# Patient Record
Sex: FEMALE | Race: WHITE | NOT HISPANIC OR LATINO | Employment: OTHER | URBAN - METROPOLITAN AREA
[De-identification: names, ages, dates, MRNs, and addresses within clinical notes are randomized per-mention and may not be internally consistent; named-entity substitution may affect disease eponyms.]

---

## 2017-01-05 PROBLEM — L03.116 CELLULITIS OF LEFT LOWER EXTREMITY: Status: ACTIVE | Noted: 2017-01-05

## 2017-01-05 PROBLEM — L03.90 CELLULITIS: Status: ACTIVE | Noted: 2017-01-05

## 2017-01-05 PROBLEM — R60.9 SWELLING: Status: ACTIVE | Noted: 2017-01-05

## 2017-01-06 PROBLEM — R60.9 SWELLING: Status: ACTIVE | Noted: 2017-01-06

## 2017-01-06 PROBLEM — L03.116 CELLULITIS OF LEFT LOWER EXTREMITY: Status: ACTIVE | Noted: 2017-01-06

## 2017-01-07 PROBLEM — N17.9 AKI (ACUTE KIDNEY INJURY): Status: ACTIVE | Noted: 2017-01-07

## 2017-01-07 PROBLEM — A41.9 SEVERE SEPSIS: Status: ACTIVE | Noted: 2017-01-07

## 2017-01-07 PROBLEM — R65.20 SEVERE SEPSIS: Status: ACTIVE | Noted: 2017-01-07

## 2017-01-10 PROBLEM — R65.21 SEPTIC SHOCK: Status: ACTIVE | Noted: 2017-01-07

## 2017-01-11 PROBLEM — A41.9 SEPTIC SHOCK: Status: RESOLVED | Noted: 2017-01-07 | Resolved: 2017-01-11

## 2017-01-11 PROBLEM — R65.21 SEPTIC SHOCK: Status: RESOLVED | Noted: 2017-01-07 | Resolved: 2017-01-11

## 2017-01-26 PROBLEM — L03.116 LEFT LEG CELLULITIS: Status: ACTIVE | Noted: 2017-01-26

## 2017-02-07 PROBLEM — R60.0 LEG EDEMA, LEFT: Status: ACTIVE | Noted: 2017-02-07

## 2017-02-13 PROBLEM — L03.90 CELLULITIS: Status: ACTIVE | Noted: 2017-02-13

## 2017-02-21 PROBLEM — N17.9 AKI (ACUTE KIDNEY INJURY): Status: RESOLVED | Noted: 2017-01-07 | Resolved: 2017-02-21

## 2017-03-09 PROBLEM — L03.116 CELLULITIS OF LEFT LOWER EXTREMITY: Status: RESOLVED | Noted: 2017-01-06 | Resolved: 2017-03-09

## 2017-03-09 PROBLEM — R60.0 LEG EDEMA, LEFT: Status: RESOLVED | Noted: 2017-02-07 | Resolved: 2017-03-09

## 2017-03-09 PROBLEM — L03.116 LEFT LEG CELLULITIS: Status: RESOLVED | Noted: 2017-01-26 | Resolved: 2017-03-09

## 2017-03-09 PROBLEM — L03.90 CELLULITIS: Status: RESOLVED | Noted: 2017-02-13 | Resolved: 2017-03-09

## 2017-03-22 PROBLEM — K81.9 CHOLECYSTITIS: Status: ACTIVE | Noted: 2017-03-22

## 2017-03-22 PROBLEM — K81.0 ACUTE CHOLECYSTITIS: Status: ACTIVE | Noted: 2017-03-22

## 2017-03-23 PROBLEM — D72.819 LEUKOPENIA: Status: ACTIVE | Noted: 2017-03-23

## 2017-03-25 PROBLEM — K81.0 ACUTE CHOLECYSTITIS: Status: RESOLVED | Noted: 2017-03-22 | Resolved: 2017-03-25

## 2017-03-25 PROBLEM — R10.9 ABDOMINAL PAIN: Status: ACTIVE | Noted: 2017-03-22

## 2017-03-25 PROBLEM — L03.119 CELLULITIS OF LEG: Status: ACTIVE | Noted: 2017-03-25

## 2017-03-27 PROBLEM — L03.119 CELLULITIS OF LEG: Status: ACTIVE | Noted: 2017-03-27

## 2017-04-03 PROBLEM — R10.9 ABDOMINAL PAIN: Status: RESOLVED | Noted: 2017-03-22 | Resolved: 2017-04-03

## 2017-04-07 PROBLEM — D50.0 IRON DEFICIENCY ANEMIA DUE TO CHRONIC BLOOD LOSS: Status: ACTIVE | Noted: 2017-04-07

## 2017-04-07 PROBLEM — D61.818 PANCYTOPENIA: Status: ACTIVE | Noted: 2017-04-07

## 2017-04-07 PROBLEM — K74.60 CIRRHOSIS OF LIVER WITHOUT ASCITES: Status: ACTIVE | Noted: 2017-04-07

## 2017-04-07 PROBLEM — M54.50 CHRONIC BILATERAL LOW BACK PAIN WITHOUT SCIATICA: Status: ACTIVE | Noted: 2017-04-07

## 2017-04-07 PROBLEM — G89.29 CHRONIC BILATERAL LOW BACK PAIN WITHOUT SCIATICA: Status: ACTIVE | Noted: 2017-04-07

## 2017-06-08 PROBLEM — Z86.79 HISTORY OF SICK SINUS SYNDROME: Status: ACTIVE | Noted: 2017-06-08

## 2017-06-08 PROBLEM — I31.39 PERICARDIAL EFFUSION: Status: ACTIVE | Noted: 2017-06-08

## 2017-06-29 PROBLEM — R07.9 CHEST PAIN: Status: ACTIVE | Noted: 2017-06-29

## 2017-06-30 PROBLEM — R10.10 UPPER ABDOMINAL PAIN: Status: ACTIVE | Noted: 2017-06-30

## 2017-07-10 PROBLEM — L03.119 CELLULITIS OF LEG: Status: RESOLVED | Noted: 2017-03-27 | Resolved: 2017-07-10

## 2017-07-10 PROBLEM — R07.9 CHEST PAIN: Status: RESOLVED | Noted: 2017-06-29 | Resolved: 2017-07-10

## 2017-07-10 PROBLEM — R10.10 UPPER ABDOMINAL PAIN: Status: RESOLVED | Noted: 2017-06-30 | Resolved: 2017-07-10

## 2017-07-11 PROBLEM — K80.20 CALCULUS OF GALLBLADDER WITHOUT CHOLECYSTITIS WITHOUT OBSTRUCTION: Status: ACTIVE | Noted: 2017-07-11

## 2017-08-03 PROBLEM — E87.70 VOLUME OVERLOAD: Status: ACTIVE | Noted: 2017-08-03

## 2017-08-03 PROBLEM — E87.6 HYPOKALEMIA: Status: ACTIVE | Noted: 2017-08-03

## 2017-08-24 PROBLEM — D62 ACUTE BLOOD LOSS ANEMIA: Status: ACTIVE | Noted: 2017-08-24

## 2017-08-24 PROBLEM — K92.1 BLOOD IN STOOL: Status: ACTIVE | Noted: 2017-08-24

## 2017-08-24 PROBLEM — R11.2 INTRACTABLE NAUSEA AND VOMITING: Status: ACTIVE | Noted: 2017-08-24

## 2017-08-29 ENCOUNTER — PATIENT OUTREACH (OUTPATIENT)
Dept: ADMINISTRATIVE | Facility: CLINIC | Age: 82
End: 2017-08-29

## 2017-08-29 NOTE — PROGRESS NOTES
TCC script completed with patient's daughter in law Juana Frazier. Reports needs mattress for hospital bed, Call to [facility] [phone], spoke with Denisse from [facility]. Denisse indicated patient can have new bed if Dr Mercado sends progress notes indicating need for bed and fax to [phone]. Message sent to Dr Mercado

## 2017-08-29 NOTE — PATIENT INSTRUCTIONS
"How to Control Nausea and Vomiting     Taken before meals, medicines can help ease nausea.    Nausea is feeling that you need to throw up. Throwing up occurs when your body forces food that is in your stomach out through your mouth. Nausea and vomiting are symptoms that are caused by many things. They can happen when a condition or disease, medicine, medical treatment, or a poisonous substance affects the area in your brain that controls vomiting. Some conditions or diseases can cause nausea, abdominal pain or cramps, and vomiting. The symptoms can be mild and go away by themselves. Other symptoms can be serious. You will need to see your healthcare provider for these.  Nausea and vomiting are common. They can be caused by many things. These include:  · "Stomach flu" (gastroenteritis)  · Food poisoning  · Stomach pain (gastritis)  · Blockages  They can also be caused by a head injury, an infection in the brain or inside the ear, or migraines. Other common causes of nausea and vomiting include:  · Brain tumor  · Brain bruise  · Motion sickness  · Drugs. These include alcohol, pain medicines such as morphine, and cancer medicines.  · Toxins. These are poisonous things like plants or liquids that are swallowed by accident.  · Advanced types of cancer  · Movement problems (psychogenic problems)  · Extra pressure in the fluid that surrounds the brain and spinal cord (elevated intracranial pressure)     Nausea and vomiting are also common side effects of chemotherapy and radiation therapy. Side effects happen when treatment changes some normal cells as well as cancer cells. In this case, the cells lining your stomach and the part of your brain that controls vomiting are affected. Other more serious causes of vomiting may be hard to find early in the illness.     When to seek medical advice  Call your healthcare provider right away if you have the following:  · Nausea or vomiting that lasts 24 hours or more  · Trouble " keeping fluids down   Medicines can help  Nausea or vomiting can often be prevented or controlled with medicines (antiemetics). Your doctor may give you antiemetics before or after treatment if you are getting chemotherapy or other medical treatments that cause nausea or vomiting.  Eating tips  · If you have medicines to control nausea, take them before meals as directed.  · Avoid fatty or greasy foods while nauseated.  · Eat small meals slowly throughout the day.  · Ask someone to sit with you while you eat to keep you from thinking about feeling nauseated.  · Eat foods at room temperature or colder to avoid strong smells.  · Eat dry foods, such as toast, crackers, or pretzels. Also eat cool, light foods, such as applesauce, and bland foods, such as oatmeal or skinned chicken.   Other ways to feel better  · Get a little fresh air. Take a short walk.  · Talk to a friend, listen to music, or watch TV.  · Take a few deep, slow breaths.  · Eat by candlelight or in surroundings that you find relaxing.  · Use a technique, such as guided imagery, to help you relax. Imagine yourself in a beautiful, restful scene. Or daydream about the place youd most like to be.  Date Last Reviewed: 1/6/2016  © 3752-9493 [employer]. [address] [place] [address], [place] [address]. All rights reserved. This information is not intended as a substitute for professional medical care. Always follow your healthcare professional's instructions.

## 2017-09-18 PROBLEM — N17.9 ACUTE KIDNEY INJURY: Status: ACTIVE | Noted: 2017-09-18

## 2017-09-18 PROBLEM — R65.20 SEVERE SEPSIS: Status: ACTIVE | Noted: 2017-09-18

## 2017-09-18 PROBLEM — A41.9 SEVERE SEPSIS: Status: ACTIVE | Noted: 2017-09-18

## 2017-09-27 PROBLEM — A41.9 SEVERE SEPSIS: Status: RESOLVED | Noted: 2017-09-18 | Resolved: 2017-09-27

## 2017-09-27 PROBLEM — N17.9 ACUTE KIDNEY INJURY: Status: RESOLVED | Noted: 2017-09-18 | Resolved: 2017-09-27

## 2017-09-27 PROBLEM — R65.20 SEVERE SEPSIS: Status: RESOLVED | Noted: 2017-09-18 | Resolved: 2017-09-27

## 2017-09-29 ENCOUNTER — OUTPATIENT CASE MANAGEMENT (OUTPATIENT)
Dept: ADMINISTRATIVE | Facility: OTHER | Age: 82
End: 2017-09-29

## 2017-09-29 ENCOUNTER — PATIENT OUTREACH (OUTPATIENT)
Dept: ADMINISTRATIVE | Facility: CLINIC | Age: 82
End: 2017-09-29

## 2017-09-29 DIAGNOSIS — A41.51 SEPSIS DUE TO ESCHERICHIA COLI: Primary | ICD-10-CM

## 2017-09-29 NOTE — PATIENT INSTRUCTIONS
Bacteremia, Suspected (Adult)  Your fever today is probably due to a viral illness. However, sometimes fever can be an early sign of a more serious bacterial infection. Bacteremia is a bacterial infection that has spread to the bloodstream. This is serious because it can spread to other organs, including the kidneys, brain, and lungs.  Your healthcare provider will perform tests (cultures) to check for bacteremia. Until the test results are known you should watch for the signs listed below.  Causes  Bacteremia usually starts with a typical infection, but it then spreads to the blood. Almost any type of infection can cause bacteremia, including a urinary tract infection, skin infection, gastrointestinal problem, surgical complication, or pneumonia.  Symptoms  At first symptoms may seem like any typical infection or illness, but then they worsen. Symptoms of bacteremia can include:  · Fever and chills  · Loss of appetite  · Nausea or vomiting  · Trouble breathing or fast breathing  · Fast heart rate  · Feeling lightheaded or faint  · Skin rashes or blotches  Home care  Follow these guidelines when caring for yourself at home.  · Rest at home for the first 2 to 3 days. When resuming activity, don't let yourself become overly tired.  · You can take acetaminophen or ibuprofen for pain, unless you were given a different pain medicine to use. (Note: If you have chronic liver or kidney disease or have ever had a stomach ulcer or gastrointestinal bleeding, talk with your healthcare provider before using these medicines. Also talk to your provider if you are taking medicine to prevent blood clots.) Aspirin should never be given to anyone younger than 18 years of age who is ill with a viral infection or fever. It may cause severe liver or brain damage.  · If you were given antibiotics, take them until they are used up, or your healthcare provider tells you to stop. It is important to finish the antibiotics even though you feel  better. This is to make sure the infection has cleared.  · Your appetite may be poor, so a light diet is fine. Avoid dehydration by drinking 6 to 8 glasses of fluid per day (such as water, soft drinks, sports drinks, juices, tea, or soup).  Follow-up care  Follow up with your healthcare provider, or as advised.  · If a culture was done, you will be notified if your treatment needs to be changed. You can call as directed for the results.  · If X-rays, a CT, or an ultrasound were done, a specialist will review them. You will be notified of any findings that may affect your care.  Call 911  Contact emergency services right away if any of these occur:  · Trouble breathing or swallowing, or wheezing  · Chest pain  · Confusion or sudden change in behavior  · Extreme drowsiness or trouble awakening  · Fainting or loss of consciousness  · Rapid heart rate  · Low blood pressure  · Vomiting blood, or large amounts of blood in stool  · Seizure  When to seek medical advice  Call your healthcare provider right away if any of these occur:  · Cough with lots of colored sputum (mucus), or blood in your sputum  · Severe headache  · Severe face, neck, throat, or ear pain  · Pain in the right lower abdomen  · Weakness, dizziness, repeated vomiting, or diarrhea  · Joint pain or a new rash  · Burning when urinating  · Fever of 100.4°F (38°C) or higher  Date Last Reviewed: 7/30/2015  © 5846-6284 [employer]. [address] [place] [address], [place] [address]. All rights reserved. This information is not intended as a substitute for professional medical care. Always follow your healthcare professional's instructions.

## 2017-09-29 NOTE — PROGRESS NOTES
Thank you for the referral.  Patient has been assigned to [facility] for low risk screening for Outpatient Case Management.     Reason for referral: Low risk     Sepsis due to Escherichia coli    Thank you,    Felisha Garcia, SSC

## 2017-09-30 PROBLEM — K92.0 HEMATEMESIS: Status: ACTIVE | Noted: 2017-09-30

## 2017-09-30 PROBLEM — K92.2 UPPER GI BLEED: Status: ACTIVE | Noted: 2017-09-30

## 2017-10-01 PROBLEM — K92.0 HEMATEMESIS: Status: ACTIVE | Noted: 2017-10-01

## 2017-10-02 ENCOUNTER — HOSPITAL ENCOUNTER (INPATIENT)
Facility: HOSPITAL | Age: 82
LOS: 3 days | Discharge: HOME HEALTH | DRG: 300 | End: 2017-10-05
Attending: INTERNAL MEDICINE | Admitting: INTERNAL MEDICINE
Payer: MEDICARE

## 2017-10-02 DIAGNOSIS — K92.2 GASTROINTESTINAL HEMORRHAGE, UNSPECIFIED GASTROINTESTINAL HEMORRHAGE TYPE: ICD-10-CM

## 2017-10-02 DIAGNOSIS — E78.5 HYPERLIPIDEMIA, UNSPECIFIED HYPERLIPIDEMIA TYPE: ICD-10-CM

## 2017-10-02 DIAGNOSIS — D62 ACUTE BLOOD LOSS ANEMIA: ICD-10-CM

## 2017-10-02 DIAGNOSIS — K92.2 GIB (GASTROINTESTINAL BLEEDING): ICD-10-CM

## 2017-10-02 LAB
ABO + RH BLD: NORMAL
ALBUMIN SERPL BCP-MCNC: 2 G/DL
ALP SERPL-CCNC: 46 U/L
ALT SERPL W/O P-5'-P-CCNC: 7 U/L
ANION GAP SERPL CALC-SCNC: 6 MMOL/L
AST SERPL-CCNC: 20 U/L
BASOPHILS # BLD AUTO: 0.03 K/UL
BASOPHILS NFR BLD: 0.7 %
BILIRUB SERPL-MCNC: 1.9 MG/DL
BLD GP AB SCN CELLS X3 SERPL QL: NORMAL
BUN SERPL-MCNC: 41 MG/DL
CALCIUM SERPL-MCNC: 8.3 MG/DL
CHLORIDE SERPL-SCNC: 107 MMOL/L
CO2 SERPL-SCNC: 24 MMOL/L
CREAT SERPL-MCNC: 1.6 MG/DL
DIFFERENTIAL METHOD: ABNORMAL
EOSINOPHIL # BLD AUTO: 0.1 K/UL
EOSINOPHIL NFR BLD: 2.7 %
ERYTHROCYTE [DISTWIDTH] IN BLOOD BY AUTOMATED COUNT: 16.8 %
EST. GFR  (AFRICAN AMERICAN): 33.2 ML/MIN/1.73 M^2
EST. GFR  (NON AFRICAN AMERICAN): 28.8 ML/MIN/1.73 M^2
GLUCOSE SERPL-MCNC: 76 MG/DL
HCT VFR BLD AUTO: 27.1 %
HGB BLD-MCNC: 8.9 G/DL
INR PPP: 1.2
LYMPHOCYTES # BLD AUTO: 0.6 K/UL
LYMPHOCYTES NFR BLD: 14.4 %
MCH RBC QN AUTO: 28.7 PG
MCHC RBC AUTO-ENTMCNC: 32.8 G/DL
MCV RBC AUTO: 87 FL
MONOCYTES # BLD AUTO: 0.4 K/UL
MONOCYTES NFR BLD: 9.3 %
NEUTROPHILS # BLD AUTO: 3 K/UL
NEUTROPHILS NFR BLD: 72.7 %
PLATELET # BLD AUTO: 143 K/UL
PMV BLD AUTO: 9.5 FL
POTASSIUM SERPL-SCNC: 4.5 MMOL/L
PROT SERPL-MCNC: 6.6 G/DL
PROTHROMBIN TIME: 12.1 SEC
RBC # BLD AUTO: 3.1 M/UL
SODIUM SERPL-SCNC: 137 MMOL/L
WBC # BLD AUTO: 4.1 K/UL

## 2017-10-02 PROCEDURE — 25000242 PHARM REV CODE 250 ALT 637 W/ HCPCS: Performed by: HOSPITALIST

## 2017-10-02 PROCEDURE — 20000000 HC ICU ROOM

## 2017-10-02 PROCEDURE — 86900 BLOOD TYPING SEROLOGIC ABO: CPT

## 2017-10-02 PROCEDURE — 86901 BLOOD TYPING SEROLOGIC RH(D): CPT

## 2017-10-02 PROCEDURE — C9113 INJ PANTOPRAZOLE SODIUM, VIA: HCPCS | Performed by: HOSPITALIST

## 2017-10-02 PROCEDURE — 85025 COMPLETE CBC W/AUTO DIFF WBC: CPT | Mod: 91

## 2017-10-02 PROCEDURE — 85610 PROTHROMBIN TIME: CPT

## 2017-10-02 PROCEDURE — 80053 COMPREHEN METABOLIC PANEL: CPT | Mod: 91

## 2017-10-02 PROCEDURE — A4216 STERILE WATER/SALINE, 10 ML: HCPCS | Performed by: HOSPITALIST

## 2017-10-02 PROCEDURE — 27000221 HC OXYGEN, UP TO 24 HOURS

## 2017-10-02 PROCEDURE — 99291 CRITICAL CARE FIRST HOUR: CPT | Mod: GC,,, | Performed by: INTERNAL MEDICINE

## 2017-10-02 PROCEDURE — 63600175 PHARM REV CODE 636 W HCPCS: Performed by: HOSPITALIST

## 2017-10-02 PROCEDURE — 25000003 PHARM REV CODE 250: Performed by: HOSPITALIST

## 2017-10-02 RX ORDER — SERTRALINE HYDROCHLORIDE 25 MG/1
25 TABLET, FILM COATED ORAL DAILY
Status: DISCONTINUED | OUTPATIENT
Start: 2017-10-03 | End: 2017-10-05 | Stop reason: HOSPADM

## 2017-10-02 RX ORDER — ASCORBIC ACID 500 MG
500 TABLET ORAL DAILY
Status: DISCONTINUED | OUTPATIENT
Start: 2017-10-03 | End: 2017-10-05 | Stop reason: HOSPADM

## 2017-10-02 RX ORDER — PANTOPRAZOLE SODIUM 40 MG/10ML
40 INJECTION, POWDER, LYOPHILIZED, FOR SOLUTION INTRAVENOUS 2 TIMES DAILY
Status: DISCONTINUED | OUTPATIENT
Start: 2017-10-02 | End: 2017-10-02

## 2017-10-02 RX ORDER — MAGNESIUM SULFATE HEPTAHYDRATE 40 MG/ML
2 INJECTION, SOLUTION INTRAVENOUS
Status: DISCONTINUED | OUTPATIENT
Start: 2017-10-02 | End: 2017-10-05 | Stop reason: HOSPADM

## 2017-10-02 RX ORDER — POTASSIUM CHLORIDE 7.45 MG/ML
10 INJECTION INTRAVENOUS
Status: DISCONTINUED | OUTPATIENT
Start: 2017-10-02 | End: 2017-10-05 | Stop reason: HOSPADM

## 2017-10-02 RX ORDER — ONDANSETRON 2 MG/ML
4 INJECTION INTRAMUSCULAR; INTRAVENOUS EVERY 8 HOURS PRN
Status: DISCONTINUED | OUTPATIENT
Start: 2017-10-02 | End: 2017-10-05 | Stop reason: HOSPADM

## 2017-10-02 RX ORDER — MAGNESIUM SULFATE HEPTAHYDRATE 40 MG/ML
4 INJECTION, SOLUTION INTRAVENOUS
Status: DISCONTINUED | OUTPATIENT
Start: 2017-10-02 | End: 2017-10-05 | Stop reason: HOSPADM

## 2017-10-02 RX ORDER — MECLIZINE HYDROCHLORIDE 25 MG/1
25 TABLET ORAL 2 TIMES DAILY PRN
Status: DISCONTINUED | OUTPATIENT
Start: 2017-10-02 | End: 2017-10-05 | Stop reason: HOSPADM

## 2017-10-02 RX ORDER — ONDANSETRON 8 MG/1
8 TABLET, ORALLY DISINTEGRATING ORAL EVERY 8 HOURS PRN
Status: DISCONTINUED | OUTPATIENT
Start: 2017-10-02 | End: 2017-10-05 | Stop reason: HOSPADM

## 2017-10-02 RX ORDER — PANTOPRAZOLE SODIUM 40 MG/10ML
40 INJECTION, POWDER, LYOPHILIZED, FOR SOLUTION INTRAVENOUS 2 TIMES DAILY
Status: DISCONTINUED | OUTPATIENT
Start: 2017-10-02 | End: 2017-10-05 | Stop reason: HOSPADM

## 2017-10-02 RX ORDER — ROPINIROLE 0.25 MG/1
1 TABLET, FILM COATED ORAL NIGHTLY
Status: DISCONTINUED | OUTPATIENT
Start: 2017-10-02 | End: 2017-10-05 | Stop reason: HOSPADM

## 2017-10-02 RX ORDER — FLUTICASONE PROPIONATE 50 MCG
2 SPRAY, SUSPENSION (ML) NASAL DAILY
Status: DISCONTINUED | OUTPATIENT
Start: 2017-10-03 | End: 2017-10-05 | Stop reason: HOSPADM

## 2017-10-02 RX ORDER — SODIUM CHLORIDE 0.9 % (FLUSH) 0.9 %
3 SYRINGE (ML) INJECTION EVERY 8 HOURS
Status: DISCONTINUED | OUTPATIENT
Start: 2017-10-02 | End: 2017-10-05 | Stop reason: HOSPADM

## 2017-10-02 RX ADMIN — ROPINIROLE HYDROCHLORIDE 1 MG: 0.25 TABLET, FILM COATED ORAL at 08:10

## 2017-10-02 RX ADMIN — CEFTRIAXONE 1 G: 1 INJECTION, SOLUTION INTRAVENOUS at 07:10

## 2017-10-02 RX ADMIN — IPRATROPIUM BROMIDE 2 PUFF: 17 AEROSOL, METERED RESPIRATORY (INHALATION) at 06:10

## 2017-10-02 RX ADMIN — PANTOPRAZOLE SODIUM 40 MG: 40 INJECTION, POWDER, FOR SOLUTION INTRAVENOUS at 05:10

## 2017-10-02 RX ADMIN — OCTREOTIDE ACETATE 50 MCG/HR: 1000 INJECTION, SOLUTION INTRAVENOUS; SUBCUTANEOUS at 07:10

## 2017-10-02 RX ADMIN — IPRATROPIUM BROMIDE 2 PUFF: 17 AEROSOL, METERED RESPIRATORY (INHALATION) at 11:10

## 2017-10-02 RX ADMIN — SODIUM CHLORIDE, PRESERVATIVE FREE 3 ML: 5 INJECTION INTRAVENOUS at 09:10

## 2017-10-02 NOTE — PLAN OF CARE
Problem: Patient Care Overview  Goal: Plan of Care Review  Outcome: Ongoing (interventions implemented as appropriate)  POC reviewed with pt and family at 1700. Pt verbalized understanding. Pt arrived at 1600 with a unit of blood infusing, infusion completed at 1640, labs drawn on pt, scheduled IV Protonix ordered. Pt place on Clear Liquid diet. Plan to be NPO at midnight tonight. Questions and concerns addressed. No acute events today. Pt progressing toward goals. Will continue to monitor. See flowsheets for full assessment and VS info.

## 2017-10-02 NOTE — ASSESSMENT & PLAN NOTE
-Most likely UGIB, recent EGD 09/26 with normal esophagus, stomach, duodenum   -h/o cirrhosis, though does not show many stigmata  -Has IJ CVL and PIV x3  -PPI IV BID  -Octreotide gtt  -CTX 1g qd

## 2017-10-02 NOTE — SUBJECTIVE & OBJECTIVE
Past Medical History:   Diagnosis Date    Abdominal pain 3/22/2017    Anemia 3/10/2015    Anxiety     Arthritis     Asthma     Atrial fibrillation     Cataract     CHF (congestive heart failure)     Colostomy in place     COPD (chronic obstructive pulmonary disease)     Depression     Diverticula of intestine     Encounter for blood transfusion     GERD (gastroesophageal reflux disease)     Heart disease     Hyperlipemia     Hypertension     Joint pain     Migraine headache     Moll's gland cyst     Pacemaker     Pacemaker 2001    Rt chest wall.     Recurrent pancreatitis 11/18/2014    Renal disorder        Past Surgical History:   Procedure Laterality Date    BLADDER DIVERTICULECTOMY      BLADDER SUSPENSION      CARDIAC PACEMAKER PLACEMENT      CARDIAC PACEMAKER PLACEMENT      CATARACT EXTRACTION      ou    COLONOSCOPY N/A 10/3/2016    Procedure: COLONOSCOPY;  Surgeon: Beverley Guerrero MD;  Location: [facility];  Service: Endoscopy;  Laterality: N/A;    HEMORRHOID SURGERY      HYSTERECTOMY      SIGMOIDECTOMY  after crissy >10yrs    with end colostomy     TONSILLECTOMY         Review of patient's allergies indicates:   Allergen Reactions    Chlorhexidine towelette Itching    Norco [hydrocodone-acetaminophen] Itching and Hallucinations       Family History     Problem Relation (Age of Onset)    Cancer Mother, Father, Brother    Heart disease Mother        Social History Main Topics    Smoking status: Former Smoker     Packs/day: 0.25     Years: 43.00     Quit date: 1/8/1974    Smokeless tobacco: Never Used    Alcohol use No    Drug use: No    Sexual activity: No      Review of Systems   Constitutional: Negative for chills and fever.   HENT: Negative for congestion and rhinorrhea.    Eyes: Negative for discharge and redness.   Respiratory: Negative for cough and shortness of breath.    Cardiovascular: Negative for chest pain and palpitations.   Gastrointestinal: Negative for  abdominal distention, abdominal pain, blood in stool, constipation, diarrhea, nausea and vomiting.   Genitourinary: Negative for dysuria and urgency.   Musculoskeletal: Negative for arthralgias and myalgias.   Skin: Negative for rash and wound.   Neurological: Negative for weakness and numbness.   Psychiatric/Behavioral: Negative for confusion. The patient is not nervous/anxious.      Objective:     Vital Signs (Most Recent):  Temp: 98.1 °F (36.7 °C) (10/02/17 1600)  Pulse: 95 (10/02/17 1700)  Resp: 20 (10/02/17 1700)  BP: 109/63 (10/02/17 1700)  SpO2: 98 % (10/02/17 1700) Vital Signs (24h Range):  Temp:  [95 °F (35 °C)-98.8 °F (37.1 °C)] 98.1 °F (36.7 °C)  Pulse:  [] 95  Resp:  [9-32] 20  SpO2:  [94 %-100 %] 98 %  BP: ()/(42-70) 109/63   Weight: 74.6 kg (164 lb 7.4 oz)  Body mass index is 30.08 kg/m².    No intake or output data in the 24 hours ending 10/02/17 1734    Physical Exam   Constitutional: She appears well-developed and well-nourished. No distress.   HENT:   Head: Normocephalic and atraumatic.   Eyes: EOM are normal. No scleral icterus.   Neck: Normal range of motion. Neck supple.   Cardiovascular: Normal rate and regular rhythm.    No murmur heard.  Pulmonary/Chest: Effort normal. No respiratory distress. She has no wheezes.   Abdominal: Soft. She exhibits no distension. There is no tenderness.   Left sided ostomy intact without melena or blood in ostomy bag. Soft liquid stool.   Musculoskeletal: She exhibits no edema or tenderness.   Neurological: She is alert.   Skin: Skin is warm and dry. She is not diaphoretic.   Psychiatric: She has a normal mood and affect. Her behavior is normal.       Vents:     Lines/Drains/Airways     Central Venous Catheter Line                 Percutaneous Central Line Insertion/Assessment - triple lumen  10/02/17 1400 left internal jugular less than 1 day          Drain                 Colostomy -- days    Female External Urinary Catheter 10/02/17 1629 less than  1 day          Pressure Ulcer                 Pressure Ulcer 09/18/17 1900 Left buttocks Stage I 13 days         Pressure Ulcer 09/18/17 1900 Right buttocks Stage II 13 days         Pressure Ulcer 09/18/17 1900 coccyx Stage I 13 days         Pressure Ulcer 09/18/17 1900 sacral spine Stage I 13 days          Peripheral Intravenous Line                 Peripheral IV - Single Lumen 09/30/17 1231 Left Hand 2 days         Peripheral IV - Single Lumen 10/01/17 1220 Right;Anterior Other 1 day              Significant Labs:    CBC/Anemia Profile:    Recent Labs  Lab 10/01/17  0753  10/02/17  0400 10/02/17  0550 10/02/17  1400 10/02/17  1651   WBC 5.19  --  3.55*  --   --  4.10   HGB 7.5*  < > 6.6* 6.5* 7.7* 8.9*   HCT 24.2*  < > 20.9* 20.9* 24.0*  24.0* 27.1*     --  152  --   --  143*   MCV 90  --  91  --   --  87   RDW 17.2*  --  17.5*  --   --  16.8*   < > = values in this interval not displayed.     Chemistries:    Recent Labs  Lab 10/01/17  0753 10/02/17  0400    137   K 5.2* 4.6    107   CO2 26 26   BUN 45* 43*   CREATININE 1.4 1.6*   CALCIUM 8.3* 8.1*   ALBUMIN 2.3* 2.0*   PROT 6.4 6.0   BILITOT 1.5* 1.0   ALKPHOS 40* 39*   ALT 9* 10   AST 21 19   MG 2.1 2.0   PHOS 4.4 4.4     Significant Imaging: I have reviewed all pertinent imaging results/findings within the past 24 hours.

## 2017-10-02 NOTE — H&P
[facility]  Critical Care Medicine  History & Physical    Patient Name: Erin Frazier  MRN: [id number]  Admission Date: 10/2/2017  Hospital Length of Stay: 0 days  Code Status: DNR  Attending Physician: Kwabena Macias MD   Primary Care Provider: Alexis Mercado MD   Principal Problem: GIB (gastrointestinal bleeding)    Subjective:     HPI:  86 y/o PMH HFpEF, s/p PPM, COPD, afib (no on anticoagulation), cirrhosis, hx of proctosigmoidectomy w/ colostomy after remote diverticulitis, cryptogenic cirrhosis, h/o CVA w/o deficits, HTN who presents as a transfer from [facility] with a chief complaint of hematemesis x1. Was recently admitted and discharged with sepsis 2/2 UTI and after going home reportedly had 1 episode of bright red blood per mouth with some dark clots. Reported similar to a previous episode 2 years ago. At that time was found to have gastric ulcers. She reported some associated nausea, weakness, lightheadedness but no fevers, chills, sweats, CP, abd pain, diarrhea. Reports ostomy output has been normal in volume and caliber. No melena or hematochezia. She reports only 1 episode of hematemesis before presenting. During his admission for sepsis 2/2 UTI he underwent EGD which was negative for a source of bleed but noted bleeding around ostomy which was seen on c-scope which underwent stitch placement by Gen Surg with hemostasis. Admit HGB 5.3 09/30. She was given 4u pRBC total prior to transfer and accepted at [facility] by GI after discussing possible need for capsule endoscopy.        Hospital/ICU Course:  No notes on file     Past Medical History:   Diagnosis Date    Abdominal pain 3/22/2017    Anemia 3/10/2015    Anxiety     Arthritis     Asthma     Atrial fibrillation     Cataract     CHF (congestive heart failure)     Colostomy in place     COPD (chronic obstructive pulmonary disease)     Depression     Diverticula of intestine     Encounter for blood transfusion      GERD (gastroesophageal reflux disease)     Heart disease     Hyperlipemia     Hypertension     Joint pain     Migraine headache     Moll's gland cyst     Pacemaker     Pacemaker 2001    Rt chest wall.     Recurrent pancreatitis 11/18/2014    Renal disorder        Past Surgical History:   Procedure Laterality Date    BLADDER DIVERTICULECTOMY      BLADDER SUSPENSION      CARDIAC PACEMAKER PLACEMENT      CARDIAC PACEMAKER PLACEMENT      CATARACT EXTRACTION      ou    COLONOSCOPY N/A 10/3/2016    Procedure: COLONOSCOPY;  Surgeon: Beverley Guerrero MD;  [facility];  Service: Endoscopy;  Laterality: N/A;    HEMORRHOID SURGERY      HYSTERECTOMY      SIGMOIDECTOMY  after crissy >10yrs    with end colostomy     TONSILLECTOMY         Review of patient's allergies indicates:   Allergen Reactions    Chlorhexidine towelette Itching    Norco [hydrocodone-acetaminophen] Itching and Hallucinations       Family History     Problem Relation (Age of Onset)    Cancer Mother, Father, Brother    Heart disease Mother        Social History Main Topics    Smoking status: Former Smoker     Packs/day: 0.25     Years: 43.00     Quit date: 1/8/1974    Smokeless tobacco: Never Used    Alcohol use No    Drug use: No    Sexual activity: No      Review of Systems   Constitutional: Negative for chills and fever.   HENT: Negative for congestion and rhinorrhea.    Eyes: Negative for discharge and redness.   Respiratory: Negative for cough and shortness of breath.    Cardiovascular: Negative for chest pain and palpitations.   Gastrointestinal: Negative for abdominal distention, abdominal pain, blood in stool, constipation, diarrhea, nausea and vomiting.   Genitourinary: Negative for dysuria and urgency.   Musculoskeletal: Negative for arthralgias and myalgias.   Skin: Negative for rash and wound.   Neurological: Negative for weakness and numbness.   Psychiatric/Behavioral: Negative for confusion. The patient is  not nervous/anxious.      Objective:     Vital Signs (Most Recent):  Temp: 98.1 °F (36.7 °C) (10/02/17 1600)  Pulse: 95 (10/02/17 1700)  Resp: 20 (10/02/17 1700)  BP: 109/63 (10/02/17 1700)  SpO2: 98 % (10/02/17 1700) Vital Signs (24h Range):  Temp:  [95 °F (35 °C)-98.8 °F (37.1 °C)] 98.1 °F (36.7 °C)  Pulse:  [] 95  Resp:  [9-32] 20  SpO2:  [94 %-100 %] 98 %  BP: ()/(42-70) 109/63   Weight: 74.6 kg (164 lb 7.4 oz)  Body mass index is 30.08 kg/m².    No intake or output data in the 24 hours ending 10/02/17 1734    Physical Exam   Constitutional: She appears well-developed and well-nourished. No distress.   HENT:   Head: Normocephalic and atraumatic.   Eyes: EOM are normal. No scleral icterus.   Neck: Normal range of motion. Neck supple.   Cardiovascular: Normal rate and regular rhythm.    No murmur heard.  Pulmonary/Chest: Effort normal. No respiratory distress. She has no wheezes.   Abdominal: Soft. She exhibits no distension. There is no tenderness.   Left sided ostomy intact without melena or blood in ostomy bag. Soft liquid stool.   Musculoskeletal: She exhibits no edema or tenderness.   Neurological: She is alert.   Skin: Skin is warm and dry. She is not diaphoretic.   Psychiatric: She has a normal mood and affect. Her behavior is normal.       Vents:     Lines/Drains/Airways     Central Venous Catheter Line                 Percutaneous Central Line Insertion/Assessment - triple lumen  10/02/17 1400 left internal jugular less than 1 day          Drain                 Colostomy -- days    Female External Urinary Catheter 10/02/17 1629 less than 1 day          Pressure Ulcer                 Pressure Ulcer 09/18/17 1900 Left buttocks Stage I 13 days         Pressure Ulcer 09/18/17 1900 Right buttocks Stage II 13 days         Pressure Ulcer 09/18/17 1900 coccyx Stage I 13 days         Pressure Ulcer 09/18/17 1900 sacral spine Stage I 13 days          Peripheral Intravenous Line                  Peripheral IV - Single Lumen 09/30/17 1231 Left Hand 2 days         Peripheral IV - Single Lumen 10/01/17 1220 Right;Anterior Other 1 day              Significant Labs:    CBC/Anemia Profile:    Recent Labs  Lab 10/01/17  0753  10/02/17  0400 10/02/17  0550 10/02/17  1400 10/02/17  1651   WBC 5.19  --  3.55*  --   --  4.10   HGB 7.5*  < > 6.6* 6.5* 7.7* 8.9*   HCT 24.2*  < > 20.9* 20.9* 24.0*  24.0* 27.1*     --  152  --   --  143*   MCV 90  --  91  --   --  87   RDW 17.2*  --  17.5*  --   --  16.8*   < > = values in this interval not displayed.     Chemistries:    Recent Labs  Lab 10/01/17  0753 10/02/17  0400    137   K 5.2* 4.6    107   CO2 26 26   BUN 45* 43*   CREATININE 1.4 1.6*   CALCIUM 8.3* 8.1*   ALBUMIN 2.3* 2.0*   PROT 6.4 6.0   BILITOT 1.5* 1.0   ALKPHOS 40* 39*   ALT 9* 10   AST 21 19   MG 2.1 2.0   PHOS 4.4 4.4     Significant Imaging: I have reviewed all pertinent imaging results/findings within the past 24 hours.    Assessment/Plan:     Neuro   History of stroke    -no residual deficits        Cardiac/Vascular   History of sick sinus syndrome    -s/p PPM        Pacemaker    -s/p PPM        Essential hypertension    -Holding home meds given bleed        Chronic atrial fibrillation    -[id number] 5  -Holding home toprol  -Off anticoag 2/2 bleeding events        Chronic diastolic CHF (congestive heart failure)    -Currently euvolemic  -Holding home lasix        Oncology   Acute blood loss anemia    -See GIB        GI   * GIB (gastrointestinal bleeding)    -Most likely UGIB, recent EGD 09/26 with normal esophagus, stomach, duodenum   -h/o cirrhosis, though does not show many stigmata  -Has IJ CVL and PIV x3  -PPI IV BID  -Octreotide gtt  -CTX 1g qd        H/O left hemicolectomy    -2/2 diverticulitis        Cirrhosis of liver without ascites    -Unclear etiology, seen on US, possibly WAY            Critical Care Daily Checklist:    A: Awake: RASS Goal/Actual Goal:   0   Actual:   0    B: Spontaneous Breathing Trial Performed?   n/a   C: SAT & SBT Coordinated?  n/a                     D: Delirium: CAM-ICU   n/a   E: Early Mobility Performed? No   F: Feeding Goal:    Status:     Current Diet Order   Procedures    Diet clear liquid    Diet NPO      AS: Analgesia/Sedation n/a   T: Thromboembolic Prophylaxis SCDs   H: HOB > 300 Yes   U: Stress Ulcer Prophylaxis (if needed) PPI   G: Glucose Control n/a   B: Bowel Function Stool Occurrence: 1 (colostomy)   I: Indwelling Catheter (Lines & Keller) Necessity IJ CVL (from OSH)   D: De-escalation of Antimicrobials/Pharmacotherapies CTX    Plan for the day/ETD GI eval in AM    Code Status:  Family/Goals of Care: DNR  Discussed with patient on admission and confirmed she does not want cardiopulmonary resuscitation or mechanical ventilation in the event of an arrest        Akash Johnson IV, MD  Critical Care Medicine  [facility]

## 2017-10-02 NOTE — PROGRESS NOTES
Patient arrived to [facility] 3081/3081 A. Connected to bedside monitor - cardiac monitoring and continuous pulse oximetry applied. Call bell within reach, side rails raised x 2, bed locked and in lowest position. Primary service notified of patient arrival. Patient in no acute distress. Will continue to monitor.

## 2017-10-02 NOTE — HPI
88 y/o PMH HFpEF, s/p PPM, COPD, afib (no on anticoagulation), cirrhosis, hx of proctosigmoidectomy w/ colostomy after remote diverticulitis, cryptogenic cirrhosis, h/o CVA w/o deficits, HTN who presents as a transfer from [facility] with a chief complaint of hematemesis x1. Was recently admitted and discharged with sepsis 2/2 UTI and after going home reportedly had 1 episode of bright red blood per mouth with some dark clots. Reported similar to a previous episode 2 years ago. At that time was found to have gastric ulcers. She reported some associated nausea, weakness, lightheadedness but no fevers, chills, sweats, CP, abd pain, diarrhea. Reports ostomy output has been normal in volume and caliber. No melena or hematochezia. She reports only 1 episode of hematemesis before presenting. During his admission for sepsis 2/2 UTI he underwent EGD which was negative for a source of bleed but noted bleeding around ostomy which was seen on c-scope which underwent stitch placement by Gen Surg with hemostasis. Admit HGB 5.3 09/30. She was given 4u pRBC total prior to transfer and accepted at [facility] [place] [place] by GI after discussing possible need for capsule endoscopy.

## 2017-10-03 ENCOUNTER — ANESTHESIA EVENT (OUTPATIENT)
Dept: ENDOSCOPY | Facility: HOSPITAL | Age: 82
DRG: 300 | End: 2017-10-03
Payer: MEDICARE

## 2017-10-03 ENCOUNTER — SURGERY (OUTPATIENT)
Age: 82
End: 2017-10-03

## 2017-10-03 ENCOUNTER — ANESTHESIA (OUTPATIENT)
Dept: ENDOSCOPY | Facility: HOSPITAL | Age: 82
DRG: 300 | End: 2017-10-03
Payer: MEDICARE

## 2017-10-03 VITALS — RESPIRATION RATE: 22 BRPM

## 2017-10-03 LAB
ALBUMIN SERPL BCP-MCNC: 1.9 G/DL
ALP SERPL-CCNC: 48 U/L
ALT SERPL W/O P-5'-P-CCNC: 6 U/L
ANION GAP SERPL CALC-SCNC: 8 MMOL/L
AST SERPL-CCNC: 20 U/L
BASOPHILS # BLD AUTO: 0.03 K/UL
BASOPHILS # BLD AUTO: 0.04 K/UL
BASOPHILS # BLD AUTO: 0.04 K/UL
BASOPHILS NFR BLD: 0.8 %
BASOPHILS NFR BLD: 1.1 %
BASOPHILS NFR BLD: 1.1 %
BILIRUB SERPL-MCNC: 1.2 MG/DL
BUN SERPL-MCNC: 39 MG/DL
CALCIUM SERPL-MCNC: 8.2 MG/DL
CHLORIDE SERPL-SCNC: 107 MMOL/L
CO2 SERPL-SCNC: 23 MMOL/L
CREAT SERPL-MCNC: 1.7 MG/DL
DIFFERENTIAL METHOD: ABNORMAL
EOSINOPHIL # BLD AUTO: 0.1 K/UL
EOSINOPHIL NFR BLD: 2.7 %
EOSINOPHIL NFR BLD: 2.8 %
EOSINOPHIL NFR BLD: 2.8 %
ERYTHROCYTE [DISTWIDTH] IN BLOOD BY AUTOMATED COUNT: 16.8 %
ERYTHROCYTE [DISTWIDTH] IN BLOOD BY AUTOMATED COUNT: 17.2 %
ERYTHROCYTE [DISTWIDTH] IN BLOOD BY AUTOMATED COUNT: 17.2 %
EST. GFR  (AFRICAN AMERICAN): 30.8 ML/MIN/1.73 M^2
EST. GFR  (NON AFRICAN AMERICAN): 26.7 ML/MIN/1.73 M^2
GLUCOSE SERPL-MCNC: 127 MG/DL
HCT VFR BLD AUTO: 26.6 %
HCT VFR BLD AUTO: 27.4 %
HCT VFR BLD AUTO: 27.4 %
HGB BLD-MCNC: 8.7 G/DL
HGB BLD-MCNC: 8.7 G/DL
HGB BLD-MCNC: 8.8 G/DL
LYMPHOCYTES # BLD AUTO: 0.5 K/UL
LYMPHOCYTES # BLD AUTO: 0.6 K/UL
LYMPHOCYTES # BLD AUTO: 0.6 K/UL
LYMPHOCYTES NFR BLD: 13.2 %
LYMPHOCYTES NFR BLD: 15.9 %
LYMPHOCYTES NFR BLD: 15.9 %
MAGNESIUM SERPL-MCNC: 1.8 MG/DL
MCH RBC QN AUTO: 28.2 PG
MCH RBC QN AUTO: 28.2 PG
MCH RBC QN AUTO: 29.1 PG
MCHC RBC AUTO-ENTMCNC: 31.8 G/DL
MCHC RBC AUTO-ENTMCNC: 31.8 G/DL
MCHC RBC AUTO-ENTMCNC: 33.1 G/DL
MCV RBC AUTO: 88 FL
MCV RBC AUTO: 89 FL
MCV RBC AUTO: 89 FL
MONOCYTES # BLD AUTO: 0.4 K/UL
MONOCYTES NFR BLD: 11.9 %
MONOCYTES NFR BLD: 9.9 %
MONOCYTES NFR BLD: 9.9 %
NEUTROPHILS # BLD AUTO: 2.5 K/UL
NEUTROPHILS # BLD AUTO: 2.5 K/UL
NEUTROPHILS # BLD AUTO: 2.6 K/UL
NEUTROPHILS NFR BLD: 70 %
NEUTROPHILS NFR BLD: 70 %
NEUTROPHILS NFR BLD: 71.1 %
PLATELET # BLD AUTO: 133 K/UL
PLATELET # BLD AUTO: 133 K/UL
PLATELET # BLD AUTO: 139 K/UL
PMV BLD AUTO: 9.2 FL
PMV BLD AUTO: 9.4 FL
PMV BLD AUTO: 9.4 FL
POTASSIUM SERPL-SCNC: 4.6 MMOL/L
PROT SERPL-MCNC: 6.6 G/DL
RBC # BLD AUTO: 3.02 M/UL
RBC # BLD AUTO: 3.09 M/UL
RBC # BLD AUTO: 3.09 M/UL
SODIUM SERPL-SCNC: 138 MMOL/L
WBC # BLD AUTO: 3.53 K/UL
WBC # BLD AUTO: 3.53 K/UL
WBC # BLD AUTO: 3.7 K/UL

## 2017-10-03 PROCEDURE — 27000221 HC OXYGEN, UP TO 24 HOURS

## 2017-10-03 PROCEDURE — 25000003 PHARM REV CODE 250: Performed by: NURSE ANESTHETIST, CERTIFIED REGISTERED

## 2017-10-03 PROCEDURE — 99232 SBSQ HOSP IP/OBS MODERATE 35: CPT | Mod: 25,GC,, | Performed by: INTERNAL MEDICINE

## 2017-10-03 PROCEDURE — 43244 EGD VARICES LIGATION: CPT | Mod: 22,GC,, | Performed by: INTERNAL MEDICINE

## 2017-10-03 PROCEDURE — 63600175 PHARM REV CODE 636 W HCPCS: Performed by: HOSPITALIST

## 2017-10-03 PROCEDURE — 37000009 HC ANESTHESIA EA ADD 15 MINS: Performed by: INTERNAL MEDICINE

## 2017-10-03 PROCEDURE — 0W3P8ZZ CONTROL BLEEDING IN GASTROINTESTINAL TRACT, VIA NATURAL OR ARTIFICIAL OPENING ENDOSCOPIC: ICD-10-PCS | Performed by: INTERNAL MEDICINE

## 2017-10-03 PROCEDURE — 83735 ASSAY OF MAGNESIUM: CPT

## 2017-10-03 PROCEDURE — 27201022: Performed by: INTERNAL MEDICINE

## 2017-10-03 PROCEDURE — 37000008 HC ANESTHESIA 1ST 15 MINUTES: Performed by: INTERNAL MEDICINE

## 2017-10-03 PROCEDURE — D9220A PRA ANESTHESIA: Mod: ANES,,, | Performed by: ANESTHESIOLOGY

## 2017-10-03 PROCEDURE — 85025 COMPLETE CBC W/AUTO DIFF WBC: CPT

## 2017-10-03 PROCEDURE — 25000242 PHARM REV CODE 250 ALT 637 W/ HCPCS: Performed by: HOSPITALIST

## 2017-10-03 PROCEDURE — D9220A PRA ANESTHESIA: Mod: CRNA,,, | Performed by: NURSE ANESTHETIST, CERTIFIED REGISTERED

## 2017-10-03 PROCEDURE — 63600175 PHARM REV CODE 636 W HCPCS: Performed by: NURSE ANESTHETIST, CERTIFIED REGISTERED

## 2017-10-03 PROCEDURE — C9113 INJ PANTOPRAZOLE SODIUM, VIA: HCPCS | Performed by: HOSPITALIST

## 2017-10-03 PROCEDURE — A4216 STERILE WATER/SALINE, 10 ML: HCPCS | Performed by: HOSPITALIST

## 2017-10-03 PROCEDURE — 80053 COMPREHEN METABOLIC PANEL: CPT

## 2017-10-03 PROCEDURE — 25000003 PHARM REV CODE 250: Performed by: HOSPITALIST

## 2017-10-03 PROCEDURE — 99233 SBSQ HOSP IP/OBS HIGH 50: CPT | Mod: GC,,, | Performed by: INTERNAL MEDICINE

## 2017-10-03 PROCEDURE — 20000000 HC ICU ROOM

## 2017-10-03 PROCEDURE — 43244 EGD VARICES LIGATION: CPT | Performed by: INTERNAL MEDICINE

## 2017-10-03 PROCEDURE — 25000003 PHARM REV CODE 250: Performed by: STUDENT IN AN ORGANIZED HEALTH CARE EDUCATION/TRAINING PROGRAM

## 2017-10-03 RX ORDER — IPRATROPIUM BROMIDE AND ALBUTEROL SULFATE 2.5; .5 MG/3ML; MG/3ML
3 SOLUTION RESPIRATORY (INHALATION) EVERY 6 HOURS PRN
Status: DISCONTINUED | OUTPATIENT
Start: 2017-10-03 | End: 2017-10-05 | Stop reason: HOSPADM

## 2017-10-03 RX ORDER — LIDOCAINE HCL/PF 100 MG/5ML
SYRINGE (ML) INTRAVENOUS
Status: DISCONTINUED | OUTPATIENT
Start: 2017-10-03 | End: 2017-10-03

## 2017-10-03 RX ORDER — SODIUM CHLORIDE 9 MG/ML
INJECTION, SOLUTION INTRAVENOUS CONTINUOUS PRN
Status: DISCONTINUED | OUTPATIENT
Start: 2017-10-03 | End: 2017-10-03

## 2017-10-03 RX ORDER — PHENYLEPHRINE HYDROCHLORIDE 10 MG/ML
INJECTION INTRAVENOUS
Status: DISCONTINUED | OUTPATIENT
Start: 2017-10-03 | End: 2017-10-03

## 2017-10-03 RX ORDER — ETOMIDATE 2 MG/ML
INJECTION INTRAVENOUS
Status: DISCONTINUED | OUTPATIENT
Start: 2017-10-03 | End: 2017-10-03

## 2017-10-03 RX ORDER — ESMOLOL HYDROCHLORIDE 10 MG/ML
INJECTION INTRAVENOUS
Status: DISCONTINUED | OUTPATIENT
Start: 2017-10-03 | End: 2017-10-03

## 2017-10-03 RX ADMIN — PHENYLEPHRINE HYDROCHLORIDE 100 MCG: 10 INJECTION INTRAVENOUS at 04:10

## 2017-10-03 RX ADMIN — ETOMIDATE 4 MG: 2 INJECTION, SOLUTION INTRAVENOUS at 04:10

## 2017-10-03 RX ADMIN — OCTREOTIDE ACETATE 50 MCG/HR: 1000 INJECTION, SOLUTION INTRAVENOUS; SUBCUTANEOUS at 02:10

## 2017-10-03 RX ADMIN — ETOMIDATE 4 MG: 2 INJECTION, SOLUTION INTRAVENOUS at 03:10

## 2017-10-03 RX ADMIN — MAGNESIUM SULFATE IN WATER 2 G: 40 INJECTION, SOLUTION INTRAVENOUS at 04:10

## 2017-10-03 RX ADMIN — ESMOLOL HYDROCHLORIDE 20 MG: 10 INJECTION INTRAVENOUS at 03:10

## 2017-10-03 RX ADMIN — OXYCODONE HYDROCHLORIDE AND ACETAMINOPHEN 500 MG: 500 TABLET ORAL at 08:10

## 2017-10-03 RX ADMIN — SERTRALINE HYDROCHLORIDE 25 MG: 25 TABLET ORAL at 08:10

## 2017-10-03 RX ADMIN — LIDOCAINE HYDROCHLORIDE 50 MG: 20 INJECTION, SOLUTION INTRAVENOUS at 03:10

## 2017-10-03 RX ADMIN — PANTOPRAZOLE SODIUM 40 MG: 40 INJECTION, POWDER, FOR SOLUTION INTRAVENOUS at 08:10

## 2017-10-03 RX ADMIN — Medication: at 09:10

## 2017-10-03 RX ADMIN — PANTOPRAZOLE SODIUM 40 MG: 40 INJECTION, POWDER, FOR SOLUTION INTRAVENOUS at 09:10

## 2017-10-03 RX ADMIN — IPRATROPIUM BROMIDE 2 PUFF: 17 AEROSOL, METERED RESPIRATORY (INHALATION) at 06:10

## 2017-10-03 RX ADMIN — IPRATROPIUM BROMIDE 2 PUFF: 17 AEROSOL, METERED RESPIRATORY (INHALATION) at 05:10

## 2017-10-03 RX ADMIN — ETOMIDATE 8 MG: 2 INJECTION, SOLUTION INTRAVENOUS at 03:10

## 2017-10-03 RX ADMIN — CEFTRIAXONE 1 G: 1 INJECTION, SOLUTION INTRAVENOUS at 06:10

## 2017-10-03 RX ADMIN — SODIUM CHLORIDE, PRESERVATIVE FREE 3 ML: 5 INJECTION INTRAVENOUS at 05:10

## 2017-10-03 RX ADMIN — SODIUM CHLORIDE, PRESERVATIVE FREE 3 ML: 5 INJECTION INTRAVENOUS at 01:10

## 2017-10-03 RX ADMIN — FLUTICASONE PROPIONATE 2 SPRAY: 50 SPRAY, METERED NASAL at 08:10

## 2017-10-03 RX ADMIN — SODIUM CHLORIDE, PRESERVATIVE FREE 3 ML: 5 INJECTION INTRAVENOUS at 09:10

## 2017-10-03 RX ADMIN — IPRATROPIUM BROMIDE 2 PUFF: 17 AEROSOL, METERED RESPIRATORY (INHALATION) at 01:10

## 2017-10-03 RX ADMIN — PHENYLEPHRINE HYDROCHLORIDE 100 MCG: 10 INJECTION INTRAVENOUS at 03:10

## 2017-10-03 RX ADMIN — OCTREOTIDE ACETATE 50 MCG/HR: 1000 INJECTION, SOLUTION INTRAVENOUS; SUBCUTANEOUS at 10:10

## 2017-10-03 RX ADMIN — SODIUM CHLORIDE: 0.9 INJECTION, SOLUTION INTRAVENOUS at 03:10

## 2017-10-03 NOTE — INTERVAL H&P NOTE
Pre-Procedure H and P Addendum    Patient seen and examined.  History and exam unchanged from prior history and physical.      Procedure: EGD  Indication: GI bleeding  ASA Class: per anesthesiology  Airway: normal  Neck Mobility: full range of motion  Mallampatti score: per anesthesia  History of anesthesia problems: no  Family history of anesthesia problems: no  Anesthesia Plan: MAC    Anesthesia/Surgery risks, benefits and alternative options discussed and understood by patient/family.          Active Hospital Problems    Diagnosis  POA    *GIB (gastrointestinal bleeding) [K92.2]  Yes    Acute blood loss anemia [D62]  Yes    History of sick sinus syndrome [Z86.79]  Not Applicable    Chronic bilateral low back pain without sciatica [M54.5, G89.29]  Yes    Cirrhosis of liver without ascites [K74.60]  Yes    Chronic diastolic CHF (congestive heart failure) [I50.32]  Yes    Chronic atrial fibrillation [I48.2]  Yes    Essential hypertension [I10]  Yes    History of stroke [Z86.73]  Not Applicable    H/O left hemicolectomy [Z98.890, Z90.49]  Not Applicable    Pacemaker [Z95.0]  Yes      Resolved Hospital Problems    Diagnosis Date Resolved POA   No resolved problems to display.

## 2017-10-03 NOTE — PROGRESS NOTES
[facility]  Critical Care Medicine  Progress Note    Patient Name: Erin Frazier  MRN: [id number]  Admission Date: 10/2/2017  Hospital Length of Stay: 1 days  Code Status: DNR  Attending Provider: Ron Joseph*  Primary Care Provider: Alexis Mercado MD   Principal Problem: GIB (gastrointestinal bleeding)    Subjective:     HPI:  88 y/o PMH HFpEF, s/p PPM, COPD, afib (no on anticoagulation), cirrhosis, hx of proctosigmoidectomy w/ colostomy after remote diverticulitis, cryptogenic cirrhosis, h/o CVA w/o deficits, HTN who presents as a transfer from [facility] with a chief complaint of hematemesis x1. Was recently admitted and discharged with sepsis 2/2 UTI and after going home reportedly had 1 episode of bright red blood per mouth with some dark clots. Reported similar to a previous episode 2 years ago. At that time was found to have gastric ulcers. She reported some associated nausea, weakness, lightheadedness but no fevers, chills, sweats, CP, abd pain, diarrhea. Reports ostomy output has been normal in volume and caliber. No melena or hematochezia. She reports only 1 episode of hematemesis before presenting. During his admission for sepsis 2/2 UTI he underwent EGD which was negative for a source of bleed but noted bleeding around ostomy which was seen on c-scope which underwent stitch placement by Gen Surg with hemostasis. Admit HGB 5.3 09/30. She was given 4u pRBC total prior to transfer and [place] [facility] [place] after discussing possible need for capsule endoscopy.        Hospital/ICU Course:  No notes on file    Interval History/Significant Events: No acute events overnight, denies further episodes of hematemesis. Plan for EGD today.     Review of Systems   Respiratory: Negative for shortness of breath.    Cardiovascular: Negative for chest pain and palpitations.   Gastrointestinal: Negative for abdominal pain, blood in stool, diarrhea, nausea and vomiting.     Objective:      Vital Signs (Most Recent):  Temp: 98.1 °F (36.7 °C) (10/03/17 0300)  Pulse: 106 (10/03/17 0600)  Resp: 17 (10/03/17 0600)  BP: 111/64 (10/03/17 0600)  SpO2: 97 % (10/03/17 0600) Vital Signs (24h Range):  Temp:  [96.9 °F (36.1 °C)-98.6 °F (37 °C)] 98.1 °F (36.7 °C)  Pulse:  [] 106  Resp:  [13-30] 17  SpO2:  [90 %-100 %] 97 %  BP: ()/(49-69) 111/64   Weight: 74.6 kg (164 lb 7.4 oz)  Body mass index is 30.08 kg/m².      Intake/Output Summary (Last 24 hours) at 10/03/17 0734  Last data filed at 10/03/17 0600   Gross per 24 hour   Intake              285 ml   Output             1700 ml   Net            -1415 ml       Physical Exam   Constitutional: She is oriented to person, place, and time. She appears well-developed. No distress.   HENT:   Mouth/Throat: No oropharyngeal exudate.   Eyes: No scleral icterus.   Cardiovascular: S1 normal, S2 normal and normal heart sounds.  An irregularly irregular rhythm present. Tachycardia present.    Pulmonary/Chest: Effort normal and breath sounds normal.   Abdominal: Soft. Bowel sounds are normal. There is no tenderness.   Musculoskeletal: She exhibits no edema.   Lymphadenopathy:     She has no cervical adenopathy.   Neurological: She is alert and oriented to person, place, and time.   Skin: Skin is dry. Capillary refill takes less than 2 seconds.   Psychiatric: She has a normal mood and affect.       Vents:     Lines/Drains/Airways     Central Venous Catheter Line                 Percutaneous Central Line Insertion/Assessment - triple lumen  10/02/17 1400 left internal jugular less than 1 day          Drain                 Colostomy -- days    Female External Urinary Catheter 10/02/17 1629 less than 1 day          Airway                 Airway - Non-Surgical 10/03/17 0500 Other (Comment) less than 1 day          Pressure Ulcer                 Pressure Ulcer 09/18/17 1900 Left buttocks Stage I 14 days         Pressure Ulcer 09/18/17 1900 Right buttocks Stage II 14  days         Pressure Ulcer 09/18/17 1900 coccyx Stage I 14 days         Pressure Ulcer 09/18/17 1900 sacral spine Stage I 14 days          Peripheral Intravenous Line                 Peripheral IV - Single Lumen 09/30/17 1231 Left Hand 2 days         Peripheral IV - Single Lumen 10/01/17 1220 Right;Anterior Other 1 day              Significant Labs:    CBC/Anemia Profile:    Recent Labs  Lab 10/02/17  0400  10/02/17  1400 10/02/17  1651 10/02/17  2344   WBC 3.55*  --   --  4.10 3.70*   HGB 6.6*  < > 7.7* 8.9* 8.8*   HCT 20.9*  < > 24.0*  24.0* 27.1* 26.6*     --   --  143* 139*   MCV 91  --   --  87 88   RDW 17.5*  --   --  16.8* 16.8*   < > = values in this interval not displayed.     Chemistries:    Recent Labs  Lab 10/01/17  0753 10/02/17  0400 10/02/17  1651 10/03/17  0239    137 137 138   K 5.2* 4.6 4.5 4.6    107 107 107   CO2 26 26 24 23   BUN 45* 43* 41* 39*   CREATININE 1.4 1.6* 1.6* 1.7*   CALCIUM 8.3* 8.1* 8.3* 8.2*   ALBUMIN 2.3* 2.0* 2.0* 1.9*   PROT 6.4 6.0 6.6 6.6   BILITOT 1.5* 1.0 1.9* 1.2*   ALKPHOS 40* 39* 46* 48*   ALT 9* 10 7* 6*   AST 21 19 20 20   MG 2.1 2.0  --  1.8   PHOS 4.4 4.4  --   --        Coagulation:   Recent Labs  Lab 10/02/17  1651   INR 1.2     Lactic Acid: No results for input(s): LACTATE in the last 48 hours.    Significant Imaging:  I have reviewed all pertinent imaging results/findings within the past 24 hours.    Assessment/Plan:     Neuro   History of stroke    -no residual deficits        Cardiac/Vascular   History of sick sinus syndrome    -s/p PPM        Chronic diastolic CHF (congestive heart failure)    -Currently euvolemic  -Holding home lasix        Chronic atrial fibrillation    -[id number] 5  -Holding home toprol  -Off anticoag 2/2 bleeding events        Essential hypertension    -Holding home meds given bleed        Pacemaker    -s/p PPM        Oncology   Acute blood loss anemia    -See GIB        GI   * GIB (gastrointestinal bleeding)    -Most  likely UGIB, recent EGD 09/26 with normal esophagus, stomach, duodenum   -s/p 4 U prbc at OSH prior to transfer  -h/o cirrhosis, though does not show many stigmata  -Has IJ CVL and PIV x3  -PPI IV BID  -Octreotide gtt  -CTX 1g qd   -GI consulted, EGD planned for this afternoon   -been NPO since midnight        Cirrhosis of liver without ascites    -Unclear etiology, seen on US, possibly WAY  -continue to monitor        H/O left hemicolectomy    -2/2 diverticulitis           Critical Care Daily Checklist:    A: Awake: RASS Goal/Actual Goal:    Actual: Holt Agitation Sedation Scale (RASS): Alert and calm   B: Spontaneous Breathing Trial Performed?     C: SAT & SBT Coordinated?  NA                      D: Delirium: CAM-ICU Overall CAM-ICU: Negative   E: Early Mobility Performed? NA   F: Feeding Goal:    Status:     Current Diet Order   Procedures    Diet NPO      AS: Analgesia/Sedation NA   T: Thromboembolic Prophylaxis Mechanical given bleed   H: HOB > 300 yes   U: Stress Ulcer Prophylaxis (if needed) PPI BID   G: Glucose Control monitor   B: Bowel Function Stool Occurrence: 1 (colostomy)   I: Indwelling Catheter (Lines & Keller) Necessity L CVC   D: De-escalation of Antimicrobials/Pharmacotherapies ceftriaxone    Plan for the day/ETD EGD, monitor H/H    Code Status:  Family/Goals of Care: DNR         Staff attestation to follow.      CHANDANA Cowan  Critical Care Medicine  [facility]

## 2017-10-03 NOTE — ASSESSMENT & PLAN NOTE
Last GI bleed was from Ostomy site. And EGD at that time was negative. But giving one episode of Hematemesis, raise the suspension of Upper GI bleed source for that reason will proceed with EGD today.     -- Intravascular resuscitation/support with IVFs and pRBCs as needed.  -- Serial H/H's transfuse as needed.  -- Discontinue all NSAIDs and Heparin products  -- Maintain IV access with 2 large bore IVs  -- Keep NPO   -- Plan for EGD today

## 2017-10-03 NOTE — H&P (VIEW-ONLY)
[facility]  Gastroenterology  Consult Note    Patient Name: Erin Frazier  MRN: [id number]  Admission Date: 10/2/2017  Hospital Length of Stay: 1 days  Code Status: DNR   Attending Provider: Ron Joseph*   Consulting Provider: Tj Pritchett MD  Primary Care Physician: Alexis Mercado MD  Principal Problem:GIB (gastrointestinal bleeding)    Inpatient consult to Gastroenterology  Consult performed by: TJ PRITCHETT  Consult ordered by: JENNIFER SIU IV  Reason for consult: GI bleed         Subjective:     HPI:  88 y/o PMH HFpEF, s/p PPM, COPD, afib (no on anticoagulation), cirrhosis, hx of proctosigmoidectomy w/ colostomy after remote diverticulitis, cryptogenic cirrhosis, h/o CVA w/o deficits, HTN who presents as a transfer from [facility] with a chief complaint of hematemesis x1. Was recently admitted and discharged with sepsis 2/2 UTI and after going home reportedly had 1 episode of bright red blood per mouth with some dark clots. Reported similar to a previous episode 2 years ago. At that time was found to have gastric ulcers. She reported some associated nausea, weakness, lightheadedness but no fevers, chills, sweats, CP, abd pain, diarrhea. Reports ostomy output has been normal in volume and caliber. No melena or hematochezia. She reports only 1 episode of hematemesis before presenting. During his admission for sepsis 2/2 UTI he underwent EGD which was negative for a source of bleed but noted bleeding around ostomy which was seen on c-scope which underwent stitch placement by Gen Surg with hemostasis. Admitted in [facility] HGB 5.3 09/30. She was given 3u pRBC total prior to transfer and accepted at [facility] by GI after discussing possible need for capsule endoscopy.     Pt. Reports no having any more bleeding. Denies abdominal pain, nausea or vomiting.        Past Medical History:   Diagnosis Date    Abdominal pain 3/22/2017    Anemia 3/10/2015    Anxiety      Arthritis     Asthma     Atrial fibrillation     Cataract     CHF (congestive heart failure)     Colostomy in place     COPD (chronic obstructive pulmonary disease)     Depression     Diverticula of intestine     Encounter for blood transfusion     GERD (gastroesophageal reflux disease)     Heart disease     Hyperlipemia     Hypertension     Joint pain     Migraine headache     Moll's gland cyst     Pacemaker     Pacemaker 2001    Rt chest wall.     Recurrent pancreatitis 11/18/2014    Renal disorder        Past Surgical History:   Procedure Laterality Date    BLADDER DIVERTICULECTOMY      BLADDER SUSPENSION      CARDIAC PACEMAKER PLACEMENT      CARDIAC PACEMAKER PLACEMENT      CATARACT EXTRACTION      ou    COLONOSCOPY N/A 10/3/2016    Procedure: COLONOSCOPY;  Surgeon: Beverley Guerrero MD;  Location: [facility];  Service: Endoscopy;  Laterality: N/A;    HEMORRHOID SURGERY      HYSTERECTOMY      SIGMOIDECTOMY  after crissy >10yrs    with end colostomy     TONSILLECTOMY         Review of patient's allergies indicates:   Allergen Reactions    Chlorhexidine towelette Itching    Norco [hydrocodone-acetaminophen] Itching and Hallucinations     Family History     Problem Relation (Age of Onset)    Cancer Mother, Father, Brother    Heart disease Mother        Social History Main Topics    Smoking status: Former Smoker     Packs/day: 0.25     Years: 43.00     Quit date: 1/8/1974    Smokeless tobacco: Never Used    Alcohol use No    Drug use: No    Sexual activity: No     Review of Systems   Constitutional: denies unintentional weight loss, night sweats, fever or chills  Eyes: denies visual changes  ENT: denies nasal congestion, ear pain or sore throat  Respiratory: denies cough, dyspnea on exertion and pleurisy  Cardiovascular: denies chest pain, dyspnea, palpitations, lower extremity edema, orthopnea and palpitations  Gastrointestinal: + hematemesis ( once before admission ). denies  nausea or vomiting, abdominal pain or change in bowel habits  Genitourinary: denies hematuria or dysuria  Musculoskeletal: denies arthralgias or myalgias  Neurological: denies seizures or tremors  Behavioral/Psych: denies auditory or visual hallucinations, SI, HI       Objective:     Vital Signs (Most Recent):  Temp: 98.1 °F (36.7 °C) (10/03/17 0700)  Pulse: 106 (10/03/17 0800)  Resp: 20 (10/03/17 0800)  BP: 118/60 (10/03/17 0800)  SpO2: (!) 92 % (10/03/17 0800) Vital Signs (24h Range):  Temp:  [96.9 °F (36.1 °C)-98.6 °F (37 °C)] 98.1 °F (36.7 °C)  Pulse:  [] 106  Resp:  [13-30] 20  SpO2:  [90 %-100 %] 92 %  BP: ()/(51-69) 118/60     Weight: 74.6 kg (164 lb 7.4 oz) (10/02/17 1612)  Body mass index is 30.08 kg/m².      Intake/Output Summary (Last 24 hours) at 10/03/17 0917  Last data filed at 10/03/17 0800   Gross per 24 hour   Intake              315 ml   Output             1700 ml   Net            -1385 ml       Lines/Drains/Airways     Central Venous Catheter Line                 Percutaneous Central Line Insertion/Assessment - triple lumen  10/02/17 1400 left internal jugular less than 1 day          Drain                 Colostomy -- days    Female External Urinary Catheter 10/02/17 1629 less than 1 day          Pressure Ulcer                 Pressure Ulcer 09/18/17 1900 Left buttocks Stage I 14 days         Pressure Ulcer 09/18/17 1900 Right buttocks Stage II 14 days         Pressure Ulcer 09/18/17 1900 coccyx Stage I 14 days         Pressure Ulcer 09/18/17 1900 sacral spine Stage I 14 days          Peripheral Intravenous Line                 Peripheral IV - Single Lumen 09/30/17 1231 Left Hand 2 days         Peripheral IV - Single Lumen 10/01/17 1220 Right;Anterior Other 1 day                Physical Exam  General: Well developed, well nourished female in NAD  HEENT: Conjunctiva clear; Oropharynx clear  Neck: No JVD noted, Supple  CV: irregular rhythm. s1 and s2 . No murmer   Resp: Lungs CTA  Bilaterally, Unlabored  Abdomen: NTND, BS normoactive x4 quads, soft  Left sided ostomy intact without melena or blood in ostomy bag. Soft liquid stool little dark.   Extrem: No cyanosis, clubbing, edema.  Skin: No rashes, lesions, ulcers  Neuro: motor strength in tact. No focal deficit   PSYCH: Oriented x3    Significant Labs:  CBC:   Recent Labs  Lab 10/02/17  1651 10/02/17  2344 10/03/17  0831   WBC 4.10 3.70* 3.53*  3.53*   HGB 8.9* 8.8* 8.7*  8.7*   HCT 27.1* 26.6* 27.4*  27.4*   * 139* 133*  133*     CMP:   Recent Labs  Lab 10/03/17  0239   *   CALCIUM 8.2*   ALBUMIN 1.9*   PROT 6.6      K 4.6   CO2 23      BUN 39*   CREATININE 1.7*   ALKPHOS 48*   ALT 6*   AST 20   BILITOT 1.2*     Coagulation:   Recent Labs  Lab 10/02/17  1651   INR 1.2       Significant Imaging:  Imaging results within the past 24 hours have been reviewed.    Assessment/Plan:     * GIB (gastrointestinal bleeding)    Last GI bleed was from Ostomy site. And EGD at that time was negative. But giving one episode of Hematemesis, raise the suspension of Upper GI bleed source for that reason will proceed with EGD today.     -- Intravascular resuscitation/support with IVFs and pRBCs as needed.  -- Serial H/H's transfuse as needed.  -- Discontinue all NSAIDs and Heparin products  -- Maintain IV access with 2 large bore IVs  -- Keep NPO   -- Plan for EGD today                Thank you for your consult. I will follow-up with patient. Please contact us if you have any additional questions.    Tj Crump MD  Gastroenterology  [facility]

## 2017-10-03 NOTE — HPI
86 y/o PMH HFpEF, s/p PPM, COPD, afib (no on anticoagulation), cirrhosis, hx of proctosigmoidectomy w/ colostomy after remote diverticulitis, cryptogenic cirrhosis, h/o CVA w/o deficits, HTN who presents as a transfer from [facility] with a chief complaint of hematemesis x1. Was recently admitted and discharged with sepsis 2/2 UTI and after going home reportedly had 1 episode of bright red blood per mouth with some dark clots. Reported similar to a previous episode 2 years ago. At that time was found to have gastric ulcers. She reported some associated nausea, weakness, lightheadedness but no fevers, chills, sweats, CP, abd pain, diarrhea. Reports ostomy output has been normal in volume and caliber. No melena or hematochezia. She reports only 1 episode of hematemesis before presenting. During his admission for sepsis 2/2 UTI he underwent EGD which was negative for a source of bleed but noted bleeding around ostomy which was seen on c-scope which underwent stitch placement by Gen Surg with hemostasis. Admitted in [facility] HGB 5.3 09/30. She was given 3u pRBC total prior to transfer and accepted at [facility] [place] after discussing possible need for capsule endoscopy.     Pt. Reports no having any more bleeding. Denies abdominal pain, nausea or vomiting.

## 2017-10-03 NOTE — TRANSFER OF CARE
"Anesthesia Transfer of Care Note    Patient: Erin Frazier    Procedure(s) Performed: Procedure(s) (LRB):  ESOPHAGOGASTRODUODENOSCOPY (EGD) (N/A)    Patient location: ICU    Anesthesia Type: general    Transport from OR: Transported from OR on 2-3 L/min O2 by NC with adequate spontaneous ventilation    Post pain: adequate analgesia    Post assessment: no apparent anesthetic complications    Post vital signs: stable    Level of consciousness: sedated    Nausea/Vomiting: no nausea/vomiting    Complications: none    Transfer of care protocol was followed      Last vitals:   Visit Vitals  /68 (BP Location: Left arm, Patient Position: Lying)   Pulse 100   Temp 36.7 °C (98.1 °F)   Resp 17   Ht 5' 2" (1.575 m)   Wt 74.6 kg (164 lb 7.4 oz)   LMP  (LMP Unknown)   SpO2 (!) 90%   Breastfeeding? No   BMI 30.08 kg/m²     "

## 2017-10-03 NOTE — PLAN OF CARE
Alexis Mercado MD  [address] / [place] [address]    [facility] - [place] - [place], [place] - [address]  [address]  [place] [address]  Phone: [phone] Fax: [phone]    Payor: MEDICARE / Plan: MEDICARE PART A & B / Product Type: Government /     Future Appointments  Date Time Provider Department Center   10/4/2017 8:15 AM [facility] LAB [facility] LAB [facility]   10/10/2017 1:40 PM PHYSICIAN EXTENDER CLINIC, [facility] WOEN [facility] WOMEN'S   10/12/2017 1:40 PM Alexis Mercado MD [facility] FAM MED KAIT ACC   10/30/2017 12:30 PM MARITZA Mehta [facility] ORTHO KAIT GOLD   12/4/2017 10:00 AM PACEMAKER, [facility] CARDPUL KAIT 3RD FL   1/8/2018 10:45 AM Sebastian Mora MD [facility] CARDIO KAIT 3RD FL   4/12/2018 1:30 PM Morales Walton MD [facility] CARDIO KAIT 3RD FL   8/13/2018 8:00 AM OPHTHALMOLOGY GENERAL, [facility] OPHTHAL KAIT GREEN     Extended Emergency Contact Information  Primary Emergency Contact: Juana Frazier   [place]  Mobile Phone: [phone]  Relation: Relative  Secondary Emergency Contact: Vin Frazier   [place]  Mobile Phone: [phone]  Relation: Son     10/03/17 1211   Discharge Assessment   Assessment Type Discharge Planning Assessment   Confirmed/corrected address and phone number on facesheet? Yes   Assessment information obtained from? Patient   Expected Length of Stay (days) 3   Communicated expected length of stay with patient/caregiver no   Prior to hospitilization cognitive status: Alert/Oriented   Prior to hospitalization functional status: Assistive Equipment   Current cognitive status: Alert/Oriented   Current Functional Status: Assistive Equipment   Facility Arrived From: [facility]   Lives With child(marshal), adult   Able to Return to Prior Arrangements yes   Is patient able to care for self after discharge? Yes   Who are your caregiver(s) and their phone number(s)?  Juana Frazier (daughter) [phone]    Patient's  perception of discharge disposition home health  ([facility])   Readmission Within The Last 30 Days previous discharge plan unsuccessful   If yes, most recent facility name: [facility]   Patient currently being followed by outpatient case management? Unable to determine (comments)   Patient currently receives any other outside agency services? No   Equipment Currently Used at Home bedside commode;hospital bed;rollator;wheelchair   Do you have any problems affording any of your prescribed medications? No   Is the patient taking medications as prescribed? yes   Does the patient have transportation home? Yes   Transportation Available family or friend will provide   Does the patient receive services at the [facility]? No   Discharge Plan A Home with family;Home Health   Discharge Plan B Skilled Nursing Facility   Patient/Family In Agreement With Plan yes   Readmission Questionnaire   At the time of your discharge, did someone talk to you about what your health problems were? Yes   At the time of discharge, did someone talk to you about what to watch out for regarding worsening of your health problem? Yes   At the time of discharge, did someone talk to you about what to do if you experienced worsening of your health problem? Yes   At the time of discharge, did someone talk to you about which medication to take when you left the hospital and which ones to stop taking? Yes   At the time of discharge, did someone talk to you about when and where to follow up with a doctor after you left the hospital? Yes   What do you believe caused you to be sick enough to be re-admitted? bleeding   How often do you need to have someone help you when you read instructions, pamphlets, or other written material from your doctor or pharmacy? Rarely   Do you have problems taking your medications as prescribed? No   Do you have any problems affording any of  your prescribed medications? No   Do you have problems  obtaining/receiving your medications? No   Does the patient have transportation to healthcare appointments? Yes   Living Arrangements house   Does the patient have family/friends to help with healtcare needs after discharge? yes   Claire Iniguez RN, BSN  Case Management  [facility]  Ext. [phone]

## 2017-10-03 NOTE — ASSESSMENT & PLAN NOTE
-Most likely UGIB, recent EGD 09/26 with normal esophagus, stomach, duodenum   -s/p 4 U prbc at OSH prior to transfer  -h/o cirrhosis, though does not show many stigmata  -Has IJ CVL and PIV x3  -PPI IV BID  -Octreotide gtt  -CTX 1g qd   -GI consulted, EGD planned for this afternoon   -been NPO since midnight

## 2017-10-03 NOTE — ANESTHESIA POSTPROCEDURE EVALUATION
"Anesthesia Post Evaluation    Patient: Erin Frazier    Procedure(s) Performed: Procedure(s) (LRB):  ESOPHAGOGASTRODUODENOSCOPY (EGD) (N/A)    Final Anesthesia Type: general  Patient location during evaluation: PACU  Patient participation: Yes- Able to Participate  Level of consciousness: awake and alert  Post-procedure vital signs: reviewed and stable  Pain management: adequate  Airway patency: patent  PONV status at discharge: No PONV  Anesthetic complications: no      Cardiovascular status: hemodynamically stable  Respiratory status: room air, spontaneous ventilation and unassisted  Hydration status: euvolemic  Follow-up not needed.        Visit Vitals  /68 (BP Location: Left arm, Patient Position: Lying)   Pulse 100   Temp 36.7 °C (98.1 °F)   Resp 17   Ht 5' 2" (1.575 m)   Wt 74.6 kg (164 lb 7.4 oz)   LMP  (LMP Unknown)   SpO2 (!) 90%   Breastfeeding? No   BMI 30.08 kg/m²       Pain/Beata Score: Pain Assessment Performed: Yes (10/3/2017 11:00 AM)  Presence of Pain: denies (10/3/2017 11:00 AM)  Pain Rating Prior to Med Admin: 5 (10/2/2017  1:58 PM)      "

## 2017-10-03 NOTE — ANESTHESIA PREPROCEDURE EVALUATION
Pre-operative evaluation for Procedure(s) (LRB):  ESOPHAGOGASTRODUODENOSCOPY (EGD) (N/A)    Erin Frazier is a 87 y.o. female w/ PMHx of HFpEF, s/p PPM, COPD, afib (not on anticoagulation), cirrhosis, hx of proctosigmoidectomy w/ colostomy after remote diverticulitis, cryptogenic cirrhosis, h/o CVA w/o deficits, HTN who presents as a transfer from [facility] with a chief complaint of hematemesis x1. Was recently admitted and discharged with sepsis 2/2 UTI and after going home reportedly had 1 episode of bright red blood per mouth with some dark clots. Reported similar to a previous episode 2 years ago. At that time was found to have gastric ulcers. She reported some associated nausea, weakness, lightheadedness but no fevers, chills, sweats, CP, abd pain, diarrhea. Reports ostomy output has been normal in volume and caliber. No melena or hematochezia. She reports only 1 episode of hematemesis before presenting. During the admission for sepsis 2/2 UTI she underwent EGD which was negative for a source of bleed but noted bleeding around ostomy which was seen on c-scope which underwent stitch placement by Gen Surg with hemostasis. Admit HGB 5.3 09/30. She was given 4u pRBC total prior to transfer and accepted at [facility] by GI after discussing possible need for capsule endoscopy.     Pt presents for above procedure given GI bleed.     LDA:   L IJ CVC  L hand 22 g PIV   R 20 g PIV     Prev airway: none on record (intubation entered 10/3 in error on wrong patient)     Drips: octreotide     Patient Active Problem List   Diagnosis    PVD (posterior vitreous detachment), both eyes    Pseudophakia of both eyes    History of spontaneous subarachnoid intracranial hemorrhage associated with coagulopathy    H/O left hemicolectomy    Pacemaker    Mixed hyperlipidemia    Recurrent UTI    Vitamin D deficiency    Primary insomnia    Gastroesophageal  reflux disease without esophagitis    Impaired fasting glucose    Panlobular emphysema    Right bundle branch block    Moderate mitral regurgitation    Restless leg syndrome    Pulmonary hypertension    Gastrointestinal hemorrhage associated with gastritis    Non morbid obesity due to excess calories    Essential hypertension    Primary osteoarthritis of both knees    History of stroke    Chronic atrial fibrillation    Chronic diastolic CHF (congestive heart failure)    Moderate aortic regurgitation    Cardiomyopathy    Leukopenia    Chronic bilateral low back pain without sciatica    Iron deficiency anemia due to chronic blood loss    Cirrhosis of liver without ascites    Pancytopenia    Pericardial effusion    History of sick sinus syndrome    Calculus of gallbladder without cholecystitis without obstruction    Hypokalemia    Volume overload    Recurrent pancreatitis    Intractable vomiting with nausea    Acute blood loss anemia    Hematemesis    Upper GI bleed    GIB (gastrointestinal bleeding)       Review of patient's allergies indicates:   Allergen Reactions    Chlorhexidine towelette Itching    Norco [hydrocodone-acetaminophen] Itching and Hallucinations        No current facility-administered medications on file prior to encounter.      Current Outpatient Prescriptions on File Prior to Encounter   Medication Sig Dispense Refill    ascorbic acid (VITAMIN C) 500 MG tablet Take 500 mg by mouth once daily.      fluticasone (FLONASE) 50 mcg/actuation nasal spray TWO SPRAYS IN EACH NOSTRIL EVERY DAY 16 g 11    furosemide (LASIX) 20 MG tablet Take 1 tablet (20 mg total) by mouth once daily. 90 tablet 3    inhalation device (AEROCHAMBER PLUS FLOW-VU) Use as directed for inhalation with albuterol inhaler. 1 Device 0    ipratropium (ATROVENT HFA) 17 mcg/actuation inhaler Inhale 2 puffs into the lungs every 6 (six) hours.      meclizine (ANTIVERT) 25 mg tablet Take 1 tablet (25 mg  "total) by mouth 2 (two) times daily as needed. 20 tablet 0    methylcellulose (ARTIFICIAL TEARS) 1 % ophthalmic solution 1 drop as needed.      metoclopramide HCl (REGLAN) 10 MG tablet Take 1/2 to 1 tablet max 4 times a day for bloating (30 min before eating) - may cause fatigue. 30 tablet 0    metoprolol succinate (TOPROL-XL) 50 MG 24 hr tablet Take 1 tablet (50 mg total) by mouth once daily. 90 tablet 3    omega-3 acid ethyl esters (LOVAZA) 1 gram capsule TAKE TWO CAPSULES BY MOUTH TWICE DAILY 360 capsule 3    ondansetron (ZOFRAN-ODT) 8 MG TbDL Take 1 tablet (8 mg total) by mouth every 8 (eight) hours as needed (nausea). 30 tablet 3    PANTOPRAZOLE SODIUM (PANTOPRAZOLE 40 MG/100 ML D5W, READY TO MIX SYSTEM,) Inject 8 mg/hr into the vein continuous.      ropinirole (REQUIP) 0.5 MG tablet Take 2 tablets (1 mg total) by mouth nightly. 180 tablet 5    sertraline (ZOLOFT) 50 MG tablet Take 1 tablet (50 mg total) by mouth once daily. (Patient taking differently: Take 25 mg by mouth once daily. ) 90 tablet 3    DAVID-FIT NATURA DRAINABLE POUCH 2 3/4 " Misc USE as instructed by physician 40 each 11    VENTOLIN HFA 90 mcg/actuation inhaler          Past Surgical History:   Procedure Laterality Date    BLADDER DIVERTICULECTOMY      BLADDER SUSPENSION      CARDIAC PACEMAKER PLACEMENT      CARDIAC PACEMAKER PLACEMENT      CATARACT EXTRACTION      ou    COLONOSCOPY N/A 10/3/2016    Procedure: COLONOSCOPY;  Surgeon: Beverley Guerrero MD;  Location: [facility];  Service: Endoscopy;  Laterality: N/A;    HEMORRHOID SURGERY      HYSTERECTOMY      SIGMOIDECTOMY  after crissy >10yrs    with end colostomy     TONSILLECTOMY         Social History     Social History    Marital status:      Spouse name: N/A    Number of children: 6    Years of education: 6     Occupational History    Not on file.     Social History Main Topics    Smoking status: Former Smoker     Packs/day: 0.25     Years: 43.00     Quit " date: 1/8/1974    Smokeless tobacco: Never Used    Alcohol use No    Drug use: No    Sexual activity: No     Other Topics Concern    Are You Pregnant Or Think You May Be? No     Social History Narrative    No narrative on file         Vital Signs Range (Last 24H):  Temp:  [36.1 °C (96.9 °F)-37 °C (98.6 °F)]   Pulse:  []   Resp:  [13-30]   BP: ()/(49-69)   SpO2:  [90 %-100 %]       CBC:   Recent Labs      10/02/17   1651  10/02/17   2344   WBC  4.10  3.70*   RBC  3.10*  3.02*   HGB  8.9*  8.8*   HCT  27.1*  26.6*   PLT  143*  139*   MCV  87  88   MCH  28.7  29.1   MCHC  32.8  33.1       CMP:   Recent Labs      10/01/17   0753  10/02/17   0400  10/02/17   1651  10/03/17   0239   NA  138  137  137  138   K  5.2*  4.6  4.5  4.6   CL  108  107  107  107   CO2  26  26  24  23   BUN  45*  43*  41*  39*   CREATININE  1.4  1.6*  1.6*  1.7*   GLU  83  81  76  127*   MG  2.1  2.0   --   1.8   PHOS  4.4  4.4   --    --    CALCIUM  8.3*  8.1*  8.3*  8.2*   ALBUMIN  2.3*  2.0*  2.0*  1.9*   PROT  6.4  6.0  6.6  6.6   ALKPHOS  40*  39*  46*  48*   ALT  9*  10  7*  6*   AST  21  19  20  20   BILITOT  1.5*  1.0  1.9*  1.2*       INR  Recent Labs      09/30/17   1347  10/01/17   0832  10/02/17   1651   INR  1.3*  1.2  1.2   APTT  32.1*   --    --            Diagnostic Studies:      EKG:  Vent. Rate : 118 BPM     Atrial Rate : 125 BPM     P-R Int : 000 ms          QRS Dur : 130 ms      QT Int : 354 ms       P-R-T Axes : 000 157 027 degrees     QTc Int : 496 ms    Atrial fibrillation with rapid ventricular response  Right bundle branch block  Left posterior fascicular block  When compared with ECG of 20-SEP-2017 10:21,  T wave inversion no longer evident in Inferior leads  Confirmed by Cassy LOPEZ, Daniel ([id number]) on 10/1/2017 10:03:09 AM    Referred By: DANIEL RYAN           Confirmed By:Daniel Madera MD    2D Echo:  CONCLUSIONS     1 - Low normal left ventricular systolic function (EF 55%).     2 - Normal right  ventricular systolic function .     3 - Indeterminate LV diastolic function.     4 - Eccentric hypertrophy.     5 - Biatrial enlargement.     6 - Trivial mitral regurgitation.     7 - Increased central venous pressure.       This document has been electronically    SIGNED BY: Sebastian Mora MD On: 08/01/2017 23:57        Anesthesia Evaluation    I have reviewed the Patient Summary Reports.     I have reviewed the Medications.     Review of Systems  Anesthesia Hx:  No problems with previous Anesthesia Denies Hx of Anesthetic complications  History of prior surgery of interest to airway management or planning:  Denies Personal Hx of Anesthesia complications.   Hematology/Oncology:     Oncology Normal    -- Anemia:   EENT/Dental:EENT/Dental Normal   Cardiovascular:   Pacemaker Hypertension Dysrhythmias atrial fibrillation CHF    Pulmonary:   COPD Asthma    Renal/:   Chronic Renal Disease    Hepatic/GI:   GERD Liver Disease,    Musculoskeletal:  Musculoskeletal Normal    Neurological:   CVA, no residual symptoms Headaches    Endocrine:  Endocrine Normal    Dermatological:  Skin Normal    Psych:   Psychiatric History          Physical Exam  General:  Well nourished    Airway/Jaw/Neck:  Airway Findings: Mouth Opening: Normal Tongue: Normal  General Airway Assessment: Adult  Jaw/Neck Findings:  Neck ROM: Normal ROM     Eyes/Ears/Nose:  EYES/EARS/NOSE FINDINGS: Normal   Dental:  Dental Findings:        Mental Status:  Mental Status Findings:  Cooperative, Alert and Oriented         Anesthesia Plan  Type of Anesthesia, risks & benefits discussed:  Anesthesia Type:  general, MAC  Patient's Preference:   Intra-op Monitoring Plan: standard ASA monitors  Intra-op Monitoring Plan Comments:   Post Op Pain Control Plan:   Post Op Pain Control Plan Comments:   Induction:   IV  Beta Blocker:  Patient is on a Beta-Blocker and has not received dose within the past 24 hours due to non-compliance or for other reasons (Patient should  receive a perioperative dose or document why it is withheld). Perioperative Beta Blocker not given due to: Other (see comments)    Beta Blocker Comments: GI bleed   Informed Consent: Patient understands risks and agrees with Anesthesia plan.  Questions answered. Anesthesia consent signed with patient.  ASA Score: 4     Day of Surgery Review of History & Physical:    H&P update referred to the surgeon.         Ready For Surgery From Anesthesia Perspective.

## 2017-10-03 NOTE — PLAN OF CARE
Problem: Patient Care Overview  Goal: Plan of Care Review  Outcome: Ongoing (interventions implemented as appropriate)  No acute events this shift. VSS. See flowsheet for full assesment. Plan is for Ms. Khan to go for an EGD today. Time pending. She has been NPO since midnight. poc updated with Ms. Erin Freddie. Verbalized understanding. Will continue to monitor closely.

## 2017-10-03 NOTE — SUBJECTIVE & OBJECTIVE
Interval History/Significant Events: No acute events overnight, denies further episodes of hematemesis. Plan for EGD today.     Review of Systems   Respiratory: Negative for shortness of breath.    Cardiovascular: Negative for chest pain and palpitations.   Gastrointestinal: Negative for abdominal pain, blood in stool, diarrhea, nausea and vomiting.     Objective:     Vital Signs (Most Recent):  Temp: 98.1 °F (36.7 °C) (10/03/17 0300)  Pulse: 106 (10/03/17 0600)  Resp: 17 (10/03/17 0600)  BP: 111/64 (10/03/17 0600)  SpO2: 97 % (10/03/17 0600) Vital Signs (24h Range):  Temp:  [96.9 °F (36.1 °C)-98.6 °F (37 °C)] 98.1 °F (36.7 °C)  Pulse:  [] 106  Resp:  [13-30] 17  SpO2:  [90 %-100 %] 97 %  BP: ()/(49-69) 111/64   Weight: 74.6 kg (164 lb 7.4 oz)  Body mass index is 30.08 kg/m².      Intake/Output Summary (Last 24 hours) at 10/03/17 0734  Last data filed at 10/03/17 0600   Gross per 24 hour   Intake              285 ml   Output             1700 ml   Net            -1415 ml       Physical Exam   Constitutional: She is oriented to person, place, and time. She appears well-developed. No distress.   HENT:   Mouth/Throat: No oropharyngeal exudate.   Eyes: No scleral icterus.   Cardiovascular: S1 normal, S2 normal and normal heart sounds.  An irregularly irregular rhythm present. Tachycardia present.    Pulmonary/Chest: Effort normal and breath sounds normal.   Abdominal: Soft. Bowel sounds are normal. There is no tenderness.   Musculoskeletal: She exhibits no edema.   Lymphadenopathy:     She has no cervical adenopathy.   Neurological: She is alert and oriented to person, place, and time.   Skin: Skin is dry. Capillary refill takes less than 2 seconds.   Psychiatric: She has a normal mood and affect.       Vents:     Lines/Drains/Airways     Central Venous Catheter Line                 Percutaneous Central Line Insertion/Assessment - triple lumen  10/02/17 1400 left internal jugular less than 1 day           Drain                 Colostomy -- days    Female External Urinary Catheter 10/02/17 1629 less than 1 day          Airway                 Airway - Non-Surgical 10/03/17 0500 Other (Comment) less than 1 day          Pressure Ulcer                 Pressure Ulcer 09/18/17 1900 Left buttocks Stage I 14 days         Pressure Ulcer 09/18/17 1900 Right buttocks Stage II 14 days         Pressure Ulcer 09/18/17 1900 coccyx Stage I 14 days         Pressure Ulcer 09/18/17 1900 sacral spine Stage I 14 days          Peripheral Intravenous Line                 Peripheral IV - Single Lumen 09/30/17 1231 Left Hand 2 days         Peripheral IV - Single Lumen 10/01/17 1220 Right;Anterior Other 1 day              Significant Labs:    CBC/Anemia Profile:    Recent Labs  Lab 10/02/17  0400  10/02/17  1400 10/02/17  1651 10/02/17  2344   WBC 3.55*  --   --  4.10 3.70*   HGB 6.6*  < > 7.7* 8.9* 8.8*   HCT 20.9*  < > 24.0*  24.0* 27.1* 26.6*     --   --  143* 139*   MCV 91  --   --  87 88   RDW 17.5*  --   --  16.8* 16.8*   < > = values in this interval not displayed.     Chemistries:    Recent Labs  Lab 10/01/17  0753 10/02/17  0400 10/02/17  1651 10/03/17  0239    137 137 138   K 5.2* 4.6 4.5 4.6    107 107 107   CO2 26 26 24 23   BUN 45* 43* 41* 39*   CREATININE 1.4 1.6* 1.6* 1.7*   CALCIUM 8.3* 8.1* 8.3* 8.2*   ALBUMIN 2.3* 2.0* 2.0* 1.9*   PROT 6.4 6.0 6.6 6.6   BILITOT 1.5* 1.0 1.9* 1.2*   ALKPHOS 40* 39* 46* 48*   ALT 9* 10 7* 6*   AST 21 19 20 20   MG 2.1 2.0  --  1.8   PHOS 4.4 4.4  --   --        Coagulation:   Recent Labs  Lab 10/02/17  1651   INR 1.2     Lactic Acid: No results for input(s): LACTATE in the last 48 hours.    Significant Imaging:  I have reviewed all pertinent imaging results/findings within the past 24 hours.

## 2017-10-03 NOTE — SUBJECTIVE & OBJECTIVE
Past Medical History:   Diagnosis Date    Abdominal pain 3/22/2017    Anemia 3/10/2015    Anxiety     Arthritis     Asthma     Atrial fibrillation     Cataract     CHF (congestive heart failure)     Colostomy in place     COPD (chronic obstructive pulmonary disease)     Depression     Diverticula of intestine     Encounter for blood transfusion     GERD (gastroesophageal reflux disease)     Heart disease     Hyperlipemia     Hypertension     Joint pain     Migraine headache     Moll's gland cyst     Pacemaker     Pacemaker 2001    Rt chest wall.     Recurrent pancreatitis 11/18/2014    Renal disorder        Past Surgical History:   Procedure Laterality Date    BLADDER DIVERTICULECTOMY      BLADDER SUSPENSION      CARDIAC PACEMAKER PLACEMENT      CARDIAC PACEMAKER PLACEMENT      CATARACT EXTRACTION      ou    COLONOSCOPY N/A 10/3/2016    Procedure: COLONOSCOPY;  Surgeon: Beverley Guerrero MD;  Location: [facility];  Service: Endoscopy;  Laterality: N/A;    HEMORRHOID SURGERY      HYSTERECTOMY      SIGMOIDECTOMY  after crissy >10yrs    with end colostomy     TONSILLECTOMY         Review of patient's allergies indicates:   Allergen Reactions    Chlorhexidine towelette Itching    Norco [hydrocodone-acetaminophen] Itching and Hallucinations     Family History     Problem Relation (Age of Onset)    Cancer Mother, Father, Brother    Heart disease Mother        Social History Main Topics    Smoking status: Former Smoker     Packs/day: 0.25     Years: 43.00     Quit date: 1/8/1974    Smokeless tobacco: Never Used    Alcohol use No    Drug use: No    Sexual activity: No     Review of Systems   Constitutional: denies unintentional weight loss, night sweats, fever or chills  Eyes: denies visual changes  ENT: denies nasal congestion, ear pain or sore throat  Respiratory: denies cough, dyspnea on exertion and pleurisy  Cardiovascular: denies chest pain, dyspnea, palpitations, lower  extremity edema, orthopnea and palpitations  Gastrointestinal: + hematemesis ( once before admission ). denies nausea or vomiting, abdominal pain or change in bowel habits  Genitourinary: denies hematuria or dysuria  Musculoskeletal: denies arthralgias or myalgias  Neurological: denies seizures or tremors  Behavioral/Psych: denies auditory or visual hallucinations, SI, HI       Objective:     Vital Signs (Most Recent):  Temp: 98.1 °F (36.7 °C) (10/03/17 0700)  Pulse: 106 (10/03/17 0800)  Resp: 20 (10/03/17 0800)  BP: 118/60 (10/03/17 0800)  SpO2: (!) 92 % (10/03/17 0800) Vital Signs (24h Range):  Temp:  [96.9 °F (36.1 °C)-98.6 °F (37 °C)] 98.1 °F (36.7 °C)  Pulse:  [] 106  Resp:  [13-30] 20  SpO2:  [90 %-100 %] 92 %  BP: ()/(51-69) 118/60     Weight: 74.6 kg (164 lb 7.4 oz) (10/02/17 1612)  Body mass index is 30.08 kg/m².      Intake/Output Summary (Last 24 hours) at 10/03/17 0917  Last data filed at 10/03/17 0800   Gross per 24 hour   Intake              315 ml   Output             1700 ml   Net            -1385 ml       Lines/Drains/Airways     Central Venous Catheter Line                 Percutaneous Central Line Insertion/Assessment - triple lumen  10/02/17 1400 left internal jugular less than 1 day          Drain                 Colostomy -- days    Female External Urinary Catheter 10/02/17 1629 less than 1 day          Pressure Ulcer                 Pressure Ulcer 09/18/17 1900 Left buttocks Stage I 14 days         Pressure Ulcer 09/18/17 1900 Right buttocks Stage II 14 days         Pressure Ulcer 09/18/17 1900 coccyx Stage I 14 days         Pressure Ulcer 09/18/17 1900 sacral spine Stage I 14 days          Peripheral Intravenous Line                 Peripheral IV - Single Lumen 09/30/17 1231 Left Hand 2 days         Peripheral IV - Single Lumen 10/01/17 1220 Right;Anterior Other 1 day                Physical Exam  General: Well developed, well nourished female in NAD  HEENT: Conjunctiva clear;  Oropharynx clear  Neck: No JVD noted, Supple  CV: irregular rhythm. s1 and s2 . No murmer   Resp: Lungs CTA Bilaterally, Unlabored  Abdomen: NTND, BS normoactive x4 quads, soft  Left sided ostomy intact without melena or blood in ostomy bag. Soft liquid stool little dark.   Extrem: No cyanosis, clubbing, edema.  Skin: No rashes, lesions, ulcers  Neuro: motor strength in tact. No focal deficit   PSYCH: Oriented x3    Significant Labs:  CBC:   Recent Labs  Lab 10/02/17  1651 10/02/17  2344 10/03/17  0831   WBC 4.10 3.70* 3.53*  3.53*   HGB 8.9* 8.8* 8.7*  8.7*   HCT 27.1* 26.6* 27.4*  27.4*   * 139* 133*  133*     CMP:   Recent Labs  Lab 10/03/17  0239   *   CALCIUM 8.2*   ALBUMIN 1.9*   PROT 6.6      K 4.6   CO2 23      BUN 39*   CREATININE 1.7*   ALKPHOS 48*   ALT 6*   AST 20   BILITOT 1.2*     Coagulation:   Recent Labs  Lab 10/02/17  1651   INR 1.2       Significant Imaging:  Imaging results within the past 24 hours have been reviewed.

## 2017-10-03 NOTE — CONSULTS
[facility]  Gastroenterology  Consult Note    Patient Name: Erin Frazier  MRN: [id number]  Admission Date: 10/2/2017  Hospital Length of Stay: 1 days  Code Status: DNR   Attending Provider: Ron Joseph*   Consulting Provider: Tj Pritchett MD  Primary Care Physician: Alexis Mercado MD  Principal Problem:GIB (gastrointestinal bleeding)    Inpatient consult to Gastroenterology  Consult performed by: TJ PRITCHETT  Consult ordered by: JENNIFER SIU IV  Reason for consult: GI bleed         Subjective:     HPI:  86 y/o PMH HFpEF, s/p PPM, COPD, afib (no on anticoagulation), cirrhosis, hx of proctosigmoidectomy w/ colostomy after remote diverticulitis, cryptogenic cirrhosis, h/o CVA w/o deficits, HTN who presents as a transfer from [facility] with a chief complaint of hematemesis x1. Was recently admitted and discharged with sepsis 2/2 UTI and after going home reportedly had 1 episode of bright red blood per mouth with some dark clots. Reported similar to a previous episode 2 years ago. At that time was found to have gastric ulcers. She reported some associated nausea, weakness, lightheadedness but no fevers, chills, sweats, CP, abd pain, diarrhea. Reports ostomy output has been normal in volume and caliber. No melena or hematochezia. She reports only 1 episode of hematemesis before presenting. During his admission for sepsis 2/2 UTI he underwent EGD which was negative for a source of bleed but noted bleeding around ostomy which was seen on c-scope which underwent stitch placement by Gen Surg with hemostasis. Admitted in [facility] HGB 5.3 09/30. She was given 3u pRBC total prior to transfer and accepted at [facility] by GI after discussing possible need for capsule endoscopy.     Pt. Reports no having any more bleeding. Denies abdominal pain, nausea or vomiting.        Past Medical History:   Diagnosis Date    Abdominal pain 3/22/2017    Anemia 3/10/2015    Anxiety      Arthritis     Asthma     Atrial fibrillation     Cataract     CHF (congestive heart failure)     Colostomy in place     COPD (chronic obstructive pulmonary disease)     Depression     Diverticula of intestine     Encounter for blood transfusion     GERD (gastroesophageal reflux disease)     Heart disease     Hyperlipemia     Hypertension     Joint pain     Migraine headache     Moll's gland cyst     Pacemaker     Pacemaker 2001    Rt chest wall.     Recurrent pancreatitis 11/18/2014    Renal disorder        Past Surgical History:   Procedure Laterality Date    BLADDER DIVERTICULECTOMY      BLADDER SUSPENSION      CARDIAC PACEMAKER PLACEMENT      CARDIAC PACEMAKER PLACEMENT      CATARACT EXTRACTION      ou    COLONOSCOPY N/A 10/3/2016    Procedure: COLONOSCOPY;  Surgeon: Beverley Guerrero MD;  Location: [facility];  Service: Endoscopy;  Laterality: N/A;    HEMORRHOID SURGERY      HYSTERECTOMY      SIGMOIDECTOMY  after crissy >10yrs    with end colostomy     TONSILLECTOMY         Review of patient's allergies indicates:   Allergen Reactions    Chlorhexidine towelette Itching    Norco [hydrocodone-acetaminophen] Itching and Hallucinations     Family History     Problem Relation (Age of Onset)    Cancer Mother, Father, Brother    Heart disease Mother        Social History Main Topics    Smoking status: Former Smoker     Packs/day: 0.25     Years: 43.00     Quit date: 1/8/1974    Smokeless tobacco: Never Used    Alcohol use No    Drug use: No    Sexual activity: No     Review of Systems   Constitutional: denies unintentional weight loss, night sweats, fever or chills  Eyes: denies visual changes  ENT: denies nasal congestion, ear pain or sore throat  Respiratory: denies cough, dyspnea on exertion and pleurisy  Cardiovascular: denies chest pain, dyspnea, palpitations, lower extremity edema, orthopnea and palpitations  Gastrointestinal: + hematemesis ( once before admission ). denies  nausea or vomiting, abdominal pain or change in bowel habits  Genitourinary: denies hematuria or dysuria  Musculoskeletal: denies arthralgias or myalgias  Neurological: denies seizures or tremors  Behavioral/Psych: denies auditory or visual hallucinations, SI, HI       Objective:     Vital Signs (Most Recent):  Temp: 98.1 °F (36.7 °C) (10/03/17 0700)  Pulse: 106 (10/03/17 0800)  Resp: 20 (10/03/17 0800)  BP: 118/60 (10/03/17 0800)  SpO2: (!) 92 % (10/03/17 0800) Vital Signs (24h Range):  Temp:  [96.9 °F (36.1 °C)-98.6 °F (37 °C)] 98.1 °F (36.7 °C)  Pulse:  [] 106  Resp:  [13-30] 20  SpO2:  [90 %-100 %] 92 %  BP: ()/(51-69) 118/60     Weight: 74.6 kg (164 lb 7.4 oz) (10/02/17 1612)  Body mass index is 30.08 kg/m².      Intake/Output Summary (Last 24 hours) at 10/03/17 0917  Last data filed at 10/03/17 0800   Gross per 24 hour   Intake              315 ml   Output             1700 ml   Net            -1385 ml       Lines/Drains/Airways     Central Venous Catheter Line                 Percutaneous Central Line Insertion/Assessment - triple lumen  10/02/17 1400 left internal jugular less than 1 day          Drain                 Colostomy -- days    Female External Urinary Catheter 10/02/17 1629 less than 1 day          Pressure Ulcer                 Pressure Ulcer 09/18/17 1900 Left buttocks Stage I 14 days         Pressure Ulcer 09/18/17 1900 Right buttocks Stage II 14 days         Pressure Ulcer 09/18/17 1900 coccyx Stage I 14 days         Pressure Ulcer 09/18/17 1900 sacral spine Stage I 14 days          Peripheral Intravenous Line                 Peripheral IV - Single Lumen 09/30/17 1231 Left Hand 2 days         Peripheral IV - Single Lumen 10/01/17 1220 Right;Anterior Other 1 day                Physical Exam  General: Well developed, well nourished female in NAD  HEENT: Conjunctiva clear; Oropharynx clear  Neck: No JVD noted, Supple  CV: irregular rhythm. s1 and s2 . No murmer   Resp: Lungs CTA  Bilaterally, Unlabored  Abdomen: NTND, BS normoactive x4 quads, soft  Left sided ostomy intact without melena or blood in ostomy bag. Soft liquid stool little dark.   Extrem: No cyanosis, clubbing, edema.  Skin: No rashes, lesions, ulcers  Neuro: motor strength in tact. No focal deficit   PSYCH: Oriented x3    Significant Labs:  CBC:   Recent Labs  Lab 10/02/17  1651 10/02/17  2344 10/03/17  0831   WBC 4.10 3.70* 3.53*  3.53*   HGB 8.9* 8.8* 8.7*  8.7*   HCT 27.1* 26.6* 27.4*  27.4*   * 139* 133*  133*     CMP:   Recent Labs  Lab 10/03/17  0239   *   CALCIUM 8.2*   ALBUMIN 1.9*   PROT 6.6      K 4.6   CO2 23      BUN 39*   CREATININE 1.7*   ALKPHOS 48*   ALT 6*   AST 20   BILITOT 1.2*     Coagulation:   Recent Labs  Lab 10/02/17  1651   INR 1.2       Significant Imaging:  Imaging results within the past 24 hours have been reviewed.    Assessment/Plan:     * GIB (gastrointestinal bleeding)    Last GI bleed was from Ostomy site. And EGD at that time was negative. But giving one episode of Hematemesis, raise the suspension of Upper GI bleed source for that reason will proceed with EGD today.     -- Intravascular resuscitation/support with IVFs and pRBCs as needed.  -- Serial H/H's transfuse as needed.  -- Discontinue all NSAIDs and Heparin products  -- Maintain IV access with 2 large bore IVs  -- Keep NPO   -- Plan for EGD today                Thank you for your consult. I will follow-up with patient. Please contact us if you have any additional questions.    Tj Crump MD  Gastroenterology  [facility]

## 2017-10-04 PROBLEM — R23.9 ALTERATION IN SKIN INTEGRITY DUE TO MOISTURE: Status: ACTIVE | Noted: 2017-10-04

## 2017-10-04 LAB
ALBUMIN SERPL BCP-MCNC: 1.9 G/DL
ALP SERPL-CCNC: 44 U/L
ALT SERPL W/O P-5'-P-CCNC: 6 U/L
ANION GAP SERPL CALC-SCNC: 8 MMOL/L
AST SERPL-CCNC: 20 U/L
BASOPHILS # BLD AUTO: 0.01 K/UL
BASOPHILS # BLD AUTO: 0.03 K/UL
BASOPHILS # BLD AUTO: 0.03 K/UL
BASOPHILS # BLD AUTO: 0.04 K/UL
BASOPHILS NFR BLD: 0.3 %
BASOPHILS NFR BLD: 1 %
BASOPHILS NFR BLD: 1 %
BASOPHILS NFR BLD: 1.3 %
BILIRUB SERPL-MCNC: 0.9 MG/DL
BUN SERPL-MCNC: 36 MG/DL
CALCIUM SERPL-MCNC: 8.1 MG/DL
CHLORIDE SERPL-SCNC: 107 MMOL/L
CO2 SERPL-SCNC: 24 MMOL/L
CREAT SERPL-MCNC: 1.5 MG/DL
DIFFERENTIAL METHOD: ABNORMAL
EOSINOPHIL # BLD AUTO: 0.1 K/UL
EOSINOPHIL # BLD AUTO: 0.2 K/UL
EOSINOPHIL NFR BLD: 3.5 %
EOSINOPHIL NFR BLD: 4 %
EOSINOPHIL NFR BLD: 4.5 %
EOSINOPHIL NFR BLD: 5.2 %
ERYTHROCYTE [DISTWIDTH] IN BLOOD BY AUTOMATED COUNT: 17 %
ERYTHROCYTE [DISTWIDTH] IN BLOOD BY AUTOMATED COUNT: 17.1 %
ERYTHROCYTE [DISTWIDTH] IN BLOOD BY AUTOMATED COUNT: 17.3 %
ERYTHROCYTE [DISTWIDTH] IN BLOOD BY AUTOMATED COUNT: 17.4 %
EST. GFR  (AFRICAN AMERICAN): 35.9 ML/MIN/1.73 M^2
EST. GFR  (NON AFRICAN AMERICAN): 31.1 ML/MIN/1.73 M^2
GLUCOSE SERPL-MCNC: 103 MG/DL
HCT VFR BLD AUTO: 25.7 %
HCT VFR BLD AUTO: 25.8 %
HCT VFR BLD AUTO: 26.3 %
HCT VFR BLD AUTO: 26.8 %
HGB BLD-MCNC: 8.2 G/DL
HGB BLD-MCNC: 8.3 G/DL
HGB BLD-MCNC: 8.4 G/DL
HGB BLD-MCNC: 8.5 G/DL
LYMPHOCYTES # BLD AUTO: 0.5 K/UL
LYMPHOCYTES # BLD AUTO: 0.6 K/UL
LYMPHOCYTES NFR BLD: 16.1 %
LYMPHOCYTES NFR BLD: 17.3 %
LYMPHOCYTES NFR BLD: 19.5 %
LYMPHOCYTES NFR BLD: 19.6 %
MAGNESIUM SERPL-MCNC: 1.9 MG/DL
MCH RBC QN AUTO: 28.8 PG
MCH RBC QN AUTO: 28.8 PG
MCH RBC QN AUTO: 29 PG
MCH RBC QN AUTO: 29.5 PG
MCHC RBC AUTO-ENTMCNC: 31.7 G/DL
MCHC RBC AUTO-ENTMCNC: 31.8 G/DL
MCHC RBC AUTO-ENTMCNC: 31.9 G/DL
MCHC RBC AUTO-ENTMCNC: 32.3 G/DL
MCV RBC AUTO: 90 FL
MCV RBC AUTO: 90 FL
MCV RBC AUTO: 91 FL
MCV RBC AUTO: 93 FL
MONOCYTES # BLD AUTO: 0.3 K/UL
MONOCYTES # BLD AUTO: 0.4 K/UL
MONOCYTES # BLD AUTO: 0.4 K/UL
MONOCYTES # BLD AUTO: 0.5 K/UL
MONOCYTES NFR BLD: 11.3 %
MONOCYTES NFR BLD: 12.3 %
MONOCYTES NFR BLD: 13.5 %
MONOCYTES NFR BLD: 14.3 %
NEUTROPHILS # BLD AUTO: 1.8 K/UL
NEUTROPHILS # BLD AUTO: 1.9 K/UL
NEUTROPHILS # BLD AUTO: 1.9 K/UL
NEUTROPHILS # BLD AUTO: 2.3 K/UL
NEUTROPHILS NFR BLD: 61.9 %
NEUTROPHILS NFR BLD: 62.6 %
NEUTROPHILS NFR BLD: 63.3 %
NEUTROPHILS NFR BLD: 66.3 %
PLATELET # BLD AUTO: 117 K/UL
PLATELET # BLD AUTO: 127 K/UL
PLATELET # BLD AUTO: 129 K/UL
PLATELET # BLD AUTO: 132 K/UL
PMV BLD AUTO: 8.9 FL
PMV BLD AUTO: 8.9 FL
PMV BLD AUTO: 9.1 FL
PMV BLD AUTO: 9.2 FL
POTASSIUM SERPL-SCNC: 4.3 MMOL/L
PROT SERPL-MCNC: 6.4 G/DL
RBC # BLD AUTO: 2.85 M/UL
RBC # BLD AUTO: 2.86 M/UL
RBC # BLD AUTO: 2.88 M/UL
RBC # BLD AUTO: 2.92 M/UL
SODIUM SERPL-SCNC: 139 MMOL/L
WBC # BLD AUTO: 2.91 K/UL
WBC # BLD AUTO: 3.02 K/UL
WBC # BLD AUTO: 3.07 K/UL
WBC # BLD AUTO: 3.47 K/UL

## 2017-10-04 PROCEDURE — 99233 SBSQ HOSP IP/OBS HIGH 50: CPT | Mod: GC,,, | Performed by: INTERNAL MEDICINE

## 2017-10-04 PROCEDURE — 25000003 PHARM REV CODE 250: Performed by: STUDENT IN AN ORGANIZED HEALTH CARE EDUCATION/TRAINING PROGRAM

## 2017-10-04 PROCEDURE — 94640 AIRWAY INHALATION TREATMENT: CPT

## 2017-10-04 PROCEDURE — 25000003 PHARM REV CODE 250: Performed by: HOSPITALIST

## 2017-10-04 PROCEDURE — 20000000 HC ICU ROOM

## 2017-10-04 PROCEDURE — A4216 STERILE WATER/SALINE, 10 ML: HCPCS | Performed by: HOSPITALIST

## 2017-10-04 PROCEDURE — 27000221 HC OXYGEN, UP TO 24 HOURS

## 2017-10-04 PROCEDURE — 80053 COMPREHEN METABOLIC PANEL: CPT

## 2017-10-04 PROCEDURE — 63600175 PHARM REV CODE 636 W HCPCS: Performed by: HOSPITALIST

## 2017-10-04 PROCEDURE — 94761 N-INVAS EAR/PLS OXIMETRY MLT: CPT

## 2017-10-04 PROCEDURE — 83735 ASSAY OF MAGNESIUM: CPT

## 2017-10-04 PROCEDURE — 85025 COMPLETE CBC W/AUTO DIFF WBC: CPT | Mod: 91

## 2017-10-04 PROCEDURE — 25000242 PHARM REV CODE 250 ALT 637 W/ HCPCS: Performed by: STUDENT IN AN ORGANIZED HEALTH CARE EDUCATION/TRAINING PROGRAM

## 2017-10-04 PROCEDURE — C9113 INJ PANTOPRAZOLE SODIUM, VIA: HCPCS | Performed by: HOSPITALIST

## 2017-10-04 PROCEDURE — 63600175 PHARM REV CODE 636 W HCPCS: Performed by: STUDENT IN AN ORGANIZED HEALTH CARE EDUCATION/TRAINING PROGRAM

## 2017-10-04 RX ORDER — METOPROLOL TARTRATE 25 MG/1
25 TABLET, FILM COATED ORAL 2 TIMES DAILY
Status: DISCONTINUED | OUTPATIENT
Start: 2017-10-04 | End: 2017-10-05 | Stop reason: HOSPADM

## 2017-10-04 RX ORDER — MAGNESIUM SULFATE HEPTAHYDRATE 40 MG/ML
2 INJECTION, SOLUTION INTRAVENOUS ONCE
Status: COMPLETED | OUTPATIENT
Start: 2017-10-04 | End: 2017-10-04

## 2017-10-04 RX ADMIN — IPRATROPIUM BROMIDE 2 PUFF: 17 AEROSOL, METERED RESPIRATORY (INHALATION) at 05:10

## 2017-10-04 RX ADMIN — IPRATROPIUM BROMIDE 2 PUFF: 17 AEROSOL, METERED RESPIRATORY (INHALATION) at 07:10

## 2017-10-04 RX ADMIN — IPRATROPIUM BROMIDE 2 PUFF: 17 AEROSOL, METERED RESPIRATORY (INHALATION) at 12:10

## 2017-10-04 RX ADMIN — PANTOPRAZOLE SODIUM 40 MG: 40 INJECTION, POWDER, FOR SOLUTION INTRAVENOUS at 08:10

## 2017-10-04 RX ADMIN — CEFTRIAXONE 1 G: 1 INJECTION, SOLUTION INTRAVENOUS at 07:10

## 2017-10-04 RX ADMIN — PANTOPRAZOLE SODIUM 40 MG: 40 INJECTION, POWDER, FOR SOLUTION INTRAVENOUS at 09:10

## 2017-10-04 RX ADMIN — IPRATROPIUM BROMIDE 2 PUFF: 17 AEROSOL, METERED RESPIRATORY (INHALATION) at 11:10

## 2017-10-04 RX ADMIN — ONDANSETRON 4 MG: 2 INJECTION INTRAMUSCULAR; INTRAVENOUS at 01:10

## 2017-10-04 RX ADMIN — OXYCODONE HYDROCHLORIDE AND ACETAMINOPHEN 500 MG: 500 TABLET ORAL at 08:10

## 2017-10-04 RX ADMIN — METOPROLOL TARTRATE 25 MG: 25 TABLET, FILM COATED ORAL at 10:10

## 2017-10-04 RX ADMIN — SODIUM CHLORIDE, PRESERVATIVE FREE 3 ML: 5 INJECTION INTRAVENOUS at 05:10

## 2017-10-04 RX ADMIN — SODIUM CHLORIDE, PRESERVATIVE FREE 3 ML: 5 INJECTION INTRAVENOUS at 11:10

## 2017-10-04 RX ADMIN — METOPROLOL TARTRATE 25 MG: 25 TABLET, FILM COATED ORAL at 09:10

## 2017-10-04 RX ADMIN — OCTREOTIDE ACETATE 50 MCG/HR: 1000 INJECTION, SOLUTION INTRAVENOUS; SUBCUTANEOUS at 09:10

## 2017-10-04 RX ADMIN — MAGNESIUM SULFATE IN WATER 2 G: 40 INJECTION, SOLUTION INTRAVENOUS at 05:10

## 2017-10-04 RX ADMIN — FLUTICASONE PROPIONATE 2 SPRAY: 50 SPRAY, METERED NASAL at 08:10

## 2017-10-04 RX ADMIN — SERTRALINE HYDROCHLORIDE 25 MG: 25 TABLET ORAL at 08:10

## 2017-10-04 RX ADMIN — ROPINIROLE HYDROCHLORIDE 1 MG: 0.25 TABLET, FILM COATED ORAL at 09:10

## 2017-10-04 RX ADMIN — IPRATROPIUM BROMIDE AND ALBUTEROL SULFATE 3 ML: .5; 3 SOLUTION RESPIRATORY (INHALATION) at 12:10

## 2017-10-04 NOTE — TREATMENT PLAN
Pt. Seen and examine. H/H stable. 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 If there is concern for splenic vein thrombosis recommend contrast-enhanced CT.    Plan:  -- continue PPI  -- Continue Octreotide for total of 72 hs from first started     Case discussed with dr. Abiodun Crump M.D.  Internal Medicine, PGY-2  [phone]

## 2017-10-04 NOTE — PHYSICIAN QUERY
PT Name: Erin Frazier  MR #: [id number]  Physician Query Form - CKD Clarification     CDS: Nan Collier RN, CCDS       Contact information: [email or web address]  This form is a permanent document in the medical record.     Query Date: October 4, 2017    By submitting this query, we are merely seeking further clarification of documentation. Please utilize your independent clinical judgment when addressing the question(s) below.    The Medical record contains the following:     Indicators   Supporting Clinical Findings   Location in Medical Record   X CKD or Chronic Kidney (Renal) Failure / Disease Chronic Renal Disease   10/3-Anes Eval   X  X BUN/Creatinine      GFR GFR=33.8, Cr=1.4, BUN=29  GFR=26.7, Cr=1.7, BUN=39 9/30-Labs  10/3-Labs    Dehydration      Nausea / Vomiting      Dialysis / CRRT      Medication      Treatment     X Other Chronic Conditions Essential HTN, Chronic Diastolic CHF, Chronic AFib, Cirrhosis 10/2-H&P    Other       Provider, please further specify the stage of CKD.      [ X ] Chronic Kidney Disease (CKD) (please specify stage* below)       [employer] Definitions  Stage Description  eGFR (mL/min)   [  ]     I Slight kidney damage with normal or increased filtration 90+   [  ]     II Mildly reduced kidney function 60-89   [ X ]     III Moderately reduced kidney function 30-59   [  ]    IV Severely reduced kidney function 15-29   [  ]     V Kidney failure, requiring transplant or dialysis <15     [  ] Other (please specify): ________________________  [  ] Clinically Undetermined    Please document in your progress notes daily for the duration of treatment until resolved and include in your discharge summary.

## 2017-10-04 NOTE — PROGRESS NOTES
"[facility]  Critical Care Medicine  Progress Note    Patient Name: Erin Frazier  MRN: [id number]  Admission Date: 10/2/2017  Hospital Length of Stay: 2 days  Code Status: DNR  Attending Provider: Ron Joseph*  Primary Care Provider: Alexis Mercado MD   Principal Problem: GIB (gastrointestinal bleeding)    Subjective:     HPI:  86 y/o PMH HFpEF, s/p PPM, COPD, afib (no on anticoagulation), cirrhosis, hx of proctosigmoidectomy w/ colostomy after remote diverticulitis, cryptogenic cirrhosis, h/o CVA w/o deficits, HTN who presents as a transfer from [facility] with a chief complaint of hematemesis x1. Was recently admitted and discharged with sepsis 2/2 UTI and after going home reportedly had 1 episode of bright red blood per mouth with some dark clots. Reported similar to a previous episode 2 years ago. At that time was found to have gastric ulcers. She reported some associated nausea, weakness, lightheadedness but no fevers, chills, sweats, CP, abd pain, diarrhea. Reports ostomy output has been normal in volume and caliber. No melena or hematochezia. She reports only 1 episode of hematemesis before presenting. During his admission for sepsis 2/2 UTI he underwent EGD which was negative for a source of bleed but noted bleeding around ostomy which was seen on c-scope which underwent stitch placement by Gen Surg with hemostasis. Admit HGB 5.3 09/30. She was given 4u pRBC total prior to transfer and accepted [facility] by GI after discussing possible need for capsule endoscopy.        Hospital/ICU Course:  No notes on file    Interval History/Significant Events: No acute events overnight, s/p EGD yesterday. No further episodes of hematemesis. States her legs have been "jumpy" from her RLS.     Review of Systems   Respiratory: Negative for shortness of breath.    Cardiovascular: Negative for chest pain and palpitations.   Gastrointestinal: Negative for abdominal pain, blood in stool, " diarrhea, nausea and vomiting.     Objective:     Vital Signs (Most Recent):  Temp: 98.2 °F (36.8 °C) (10/04/17 0300)  Pulse: (!) 111 (10/04/17 0600)  Resp: 15 (10/04/17 0600)  BP: (!) 110/55 (10/04/17 0600)  SpO2: 97 % (10/04/17 0600) Vital Signs (24h Range):  Temp:  [98.1 °F (36.7 °C)-98.6 °F (37 °C)] 98.2 °F (36.8 °C)  Pulse:  [] 111  Resp:  [12-42] 15  SpO2:  [90 %-100 %] 97 %  BP: (109-146)/(55-77) 110/55   Weight: 74.8 kg (164 lb 14.5 oz)  Body mass index is 30.16 kg/m².      Intake/Output Summary (Last 24 hours) at 10/04/17 0727  Last data filed at 10/04/17 0600   Gross per 24 hour   Intake           849.83 ml   Output             1000 ml   Net          -150.17 ml       Physical Exam   Constitutional: She is oriented to person, place, and time. She appears well-developed. No distress.   HENT:   Mouth/Throat: No oropharyngeal exudate.   Eyes: No scleral icterus.   Cardiovascular: S1 normal, S2 normal and normal heart sounds.  An irregularly irregular rhythm present. Tachycardia present.    Pulmonary/Chest: Effort normal and breath sounds normal.   Abdominal: Soft. Bowel sounds are normal. There is no tenderness.   Musculoskeletal: She exhibits no edema.   Lymphadenopathy:     She has no cervical adenopathy.   Neurological: She is alert and oriented to person, place, and time.   Skin: Skin is dry. Capillary refill takes less than 2 seconds.   Psychiatric: She has a normal mood and affect.       Vents:     Lines/Drains/Airways     Central Venous Catheter Line                 Percutaneous Central Line Insertion/Assessment - triple lumen  10/02/17 1400 left internal jugular 1 day          Drain                 Colostomy -- days          Pressure Ulcer                 Pressure Ulcer 09/18/17 1900 Left buttocks Stage I 15 days         Pressure Ulcer 09/18/17 1900 Right buttocks Stage II 15 days         Pressure Ulcer 09/18/17 1900 coccyx Stage I 15 days         Pressure Ulcer 09/18/17 1900 sacral spine Stage I  15 days          Peripheral Intravenous Line                 Peripheral IV - Single Lumen 09/30/17 1231 Left Hand 3 days         Peripheral IV - Single Lumen 10/01/17 1220 Right;Anterior Other 2 days              Significant Labs:    CBC/Anemia Profile:    Recent Labs  Lab 10/02/17  2344 10/03/17  0831 10/04/17  0003   WBC 3.70* 3.53*  3.53* 3.47*   HGB 8.8* 8.7*  8.7* 8.3*   HCT 26.6* 27.4*  27.4* 25.7*   * 133*  133* 127*   MCV 88 89  89 90   RDW 16.8* 17.2*  17.2* 17.0*        Chemistries:    Recent Labs  Lab 10/02/17  1651 10/03/17  0239 10/04/17  0310    138 139   K 4.5 4.6 4.3    107 107   CO2 24 23 24   BUN 41* 39* 36*   CREATININE 1.6* 1.7* 1.5*   CALCIUM 8.3* 8.2* 8.1*   ALBUMIN 2.0* 1.9* 1.9*   PROT 6.6 6.6 6.4   BILITOT 1.9* 1.2* 0.9   ALKPHOS 46* 48* 44*   ALT 7* 6* 6*   AST 20 20 20   MG  --  1.8 1.9       Coagulation:     Recent Labs  Lab 10/02/17  1651   INR 1.2     Lactic Acid: No results for input(s): LACTATE in the last 48 hours.    Significant Imaging:  I have reviewed all pertinent imaging results/findings within the past 24 hours.    Assessment/Plan:     Neuro   History of stroke    -no residual deficits        Cardiac/Vascular   History of sick sinus syndrome    -s/p PPM        Chronic diastolic CHF (congestive heart failure)    -Currently euvolemic  -Holding home lasix        Chronic atrial fibrillation    -[id number] 5  -Holding home toprol  -Off anticoag 2/2 bleeding events        Essential hypertension    -Holding home meds given bleed        Pacemaker    -s/p PPM        Oncology   Acute blood loss anemia    -See GIB        GI   * GIB (gastrointestinal bleeding)    -Most likely UGIB, recent EGD 09/26 with normal esophagus, stomach, duodenum   -s/p 4 U prbc at OSH prior to transfer  -h/o cirrhosis, though does not show many stigmata  -Has IJ CVL and PIV x3  -PPI IV BID  -Octreotide gtt  -CTX 1g qd   -GI consulted     -s/p EGD 10/3, banded gastric varix     -recommend  abdo u/s with doppler to evaluate for thrombosis (splenic vein) given gastric varix          -once patient returns from abdo u/s, can advance to CLD        Cirrhosis of liver without ascites    -Unclear etiology, seen on US, possibly WAY  -continue to monitor  -will need outpatient hepatology workup          H/O left hemicolectomy    -2/2 diverticulitis           Critical Care Daily Checklist:    A: Awake: RASS Goal/Actual Goal: RASS Goal: 0-->alert and calm  Actual: Holt Agitation Sedation Scale (RASS): Alert and calm   B: Spontaneous Breathing Trial Performed?     C: SAT & SBT Coordinated?  NA                      D: Delirium: CAM-ICU Overall CAM-ICU: Negative   E: Early Mobility Performed? PTOT consulted, follow up recs   F: Feeding Goal:    Status:     Current Diet Order   Procedures    Diet NPO     Can advance to CLD once pt is back from U/S   AS: Analgesia/Sedation NA   T: Thromboembolic Prophylaxis Mechanical 2/2 to bleed   H: HOB > 300 yes   U: Stress Ulcer Prophylaxis (if needed) PPI BID   G: Glucose Control monitor   B: Bowel Function Stool Occurrence: 1 (colostomy)   I: Indwelling Catheter (Lines & Keller) Necessity LIJ CVC   D: De-escalation of Antimicrobials/Pharmacotherapies On ppx ceftriaxone    Plan for the day/ETD Monitor H/H, advance diet as tolerated, possible stepdown.     Code Status:  Family/Goals of Care: DNR       Staff attestation to follow.          CHANDANA Cowan  Critical Care Medicine  [facility]

## 2017-10-04 NOTE — RESIDENT HANDOFF
Handoff     Primary Team: [facility] CRITICAL CARE MEDICINE Room Number: 3081/3081 A     Patient Name: rEin Frazier MRN: [id number]     Date of Birth: [id number] Allergies: Chlorhexidine towelette and Norco [hydrocodone-acetaminophen]     Age: 87 y.o. Admit Date: 10/2/2017     Sex: female  BMI: Body mass index is 30.16 kg/m².     Code Status: DNR        Illness Level (current clinical status): Watcher - No    Reason for Admission: GIB (gastrointestinal bleeding)    Brief HPI (pertinent PMH and diagnosis or differential diagnosis):     86 y/o PMH HFpEF, s/p PPM, COPD, afib (no on anticoagulation), hx of proctosigmoidectomy w/ colostomy after remote diverticulitis, cryptogenic cirrhosis, h/o CVA w/o deficits, HTN who presents as a transfer from [facility] with a chief complaint of hematemesis x1. Was recently admitted and discharged with sepsis 2/2 UTI and after going home reportedly had 1 episode of bright red blood per mouth with some dark clots. Reported similar to a previous episode 2 years ago. At that time was found to have gastric ulcers. She reported some associated nausea, weakness, lightheadedness but no fevers, chills, sweats, CP, abd pain, diarrhea. Reports ostomy output has been normal in volume and caliber. No melena or hematochezia. She reports only 1 episode of hematemesis before presenting. During his admission for sepsis 2/2 UTI he underwent EGD which was negative for a source of bleed but noted bleeding around ostomy which was seen on c-scope which underwent stitch placement by Gen Surg with hemostasis. Admit HGB 5.3 09/30. She was given 4u pRBC total prior to transfer and accepted at [facility] by GI after discussing possible need for capsule endoscopy.        Procedure Date: EGD 10/3    Hospital Course (updated, brief assessment by system or problem, significant events): Patient admitted to ICU for GI evaluation, was started on PPI BID, SBP ppx ceftriaxone for possible variceal bleed given remote hx of cirrhosis,  and octreotide. Pt underwent EGD on 10/3 that revealed gastric varix that was banded. H/H remained stable. GI recommending abdo U/S with doppler to evaluate for splenic/gastric thrombosis given gastric varices. Abdo U/S did not revealing splenic vein thrombosis; notable for cholelithiasis with GB wall hyperemia. Received 72 hours of octreotide.     Tasks (specific, using if-then statements):     Monitor H/H  Follow up GI recs  Follow up PTOT for recs re: disposition  Restart home meds when appropriate (ie lasix)  Contingency Plan (special circumstances anticipated and plan):     Will need close follow up with PCP and outpatient hepatology (in [place]) for cryptogenic cirrhosis      Discharge Disposition: Pending PTOT recs

## 2017-10-04 NOTE — PLAN OF CARE
Problem: Patient Care Overview  Goal: Plan of Care Review  Outcome: Ongoing (interventions implemented as appropriate)  No acute events throughout day. See vital signs and assessments in flowsheets. See below for updates on today's progress.     Pulmonary: Sats >88% on RA    Cardiovascular: Afib/paced 70s-120s, SBP 90-110s    Neurological: AAOx4    Gastrointestinal: Emptied 250cc from colostomy    Genitourinary: Voids spontaneously, about 600cc output for shift    Infusions: Octreotide    Patient progressing towards goals as tolerated, plan of care communicated and reviewed with Erin Frazier and family. All concerns addressed. Will continue to monitor.

## 2017-10-04 NOTE — PLAN OF CARE
Problem: Patient Care Overview  Goal: Plan of Care Review  Outcome: Ongoing (interventions implemented as appropriate)  Pt remains afebrile and VSS. See flow sheet for full assessment. Pt remains on continuous tele and pulse ox monitor, on 1 L O2 via Nc. No falls. Pt complained of discomfort in her legs due to restless leg syndrome. Spoke to MD about giving pt her schedule Requip, however MD wants pt to remain NPO (w/ few ice chips) for now. She also complained of itching for which she usually takes oral Benadryl, MD ordered Benadryl ointment. Moderate relief obtained. Pt remains on octreotide gtt. AM labs drawn and resulted. Will replace Mag per MD order. POC reviewed w/ pt who verbalized understanding. All questions and concerns addressed. Will continue to monitor.

## 2017-10-04 NOTE — SUBJECTIVE & OBJECTIVE
"Interval History/Significant Events: No acute events overnight, s/p EGD yesterday. No further episodes of hematemesis. States her legs have been "jumpy" from her RLS; otherwise offers no further complaints.     Review of Systems   Respiratory: Negative for shortness of breath.    Cardiovascular: Negative for chest pain and palpitations.   Gastrointestinal: Negative for abdominal pain, blood in stool, diarrhea, nausea and vomiting.     Objective:     Vital Signs (Most Recent):  Temp: 98.2 °F (36.8 °C) (10/04/17 0300)  Pulse: (!) 111 (10/04/17 0600)  Resp: 15 (10/04/17 0600)  BP: (!) 110/55 (10/04/17 0600)  SpO2: 97 % (10/04/17 0600) Vital Signs (24h Range):  Temp:  [98.1 °F (36.7 °C)-98.6 °F (37 °C)] 98.2 °F (36.8 °C)  Pulse:  [] 111  Resp:  [12-42] 15  SpO2:  [90 %-100 %] 97 %  BP: (109-146)/(55-77) 110/55   Weight: 74.8 kg (164 lb 14.5 oz)  Body mass index is 30.16 kg/m².      Intake/Output Summary (Last 24 hours) at 10/04/17 0727  Last data filed at 10/04/17 0600   Gross per 24 hour   Intake           849.83 ml   Output             1000 ml   Net          -150.17 ml       Physical Exam   Constitutional: She is oriented to person, place, and time. She appears well-developed. No distress.   HENT:   Mouth/Throat: No oropharyngeal exudate.   Eyes: No scleral icterus.   Cardiovascular: S1 normal, S2 normal and normal heart sounds.  An irregularly irregular rhythm present. Tachycardia present.    Pulmonary/Chest: Effort normal and breath sounds normal.   Abdominal: Soft. Bowel sounds are normal. There is no tenderness.   LLQ ostomy with dark stool present   Musculoskeletal: She exhibits no edema.   Lymphadenopathy:     She has no cervical adenopathy.   Neurological: She is alert and oriented to person, place, and time.   Skin: Skin is dry. Capillary refill takes less than 2 seconds.   Psychiatric: She has a normal mood and affect.       Vents:     Lines/Drains/Airways     Central Venous Catheter Line                "  Percutaneous Central Line Insertion/Assessment - triple lumen  10/02/17 1400 left internal jugular 1 day          Drain                 Colostomy -- days          Pressure Ulcer                 Pressure Ulcer 09/18/17 1900 Left buttocks Stage I 15 days         Pressure Ulcer 09/18/17 1900 Right buttocks Stage II 15 days         Pressure Ulcer 09/18/17 1900 coccyx Stage I 15 days         Pressure Ulcer 09/18/17 1900 sacral spine Stage I 15 days          Peripheral Intravenous Line                 Peripheral IV - Single Lumen 09/30/17 1231 Left Hand 3 days         Peripheral IV - Single Lumen 10/01/17 1220 Right;Anterior Other 2 days              Significant Labs:    CBC/Anemia Profile:    Recent Labs  Lab 10/02/17  2344 10/03/17  0831 10/04/17  0003   WBC 3.70* 3.53*  3.53* 3.47*   HGB 8.8* 8.7*  8.7* 8.3*   HCT 26.6* 27.4*  27.4* 25.7*   * 133*  133* 127*   MCV 88 89  89 90   RDW 16.8* 17.2*  17.2* 17.0*        Chemistries:    Recent Labs  Lab 10/02/17  1651 10/03/17  0239 10/04/17  0310    138 139   K 4.5 4.6 4.3    107 107   CO2 24 23 24   BUN 41* 39* 36*   CREATININE 1.6* 1.7* 1.5*   CALCIUM 8.3* 8.2* 8.1*   ALBUMIN 2.0* 1.9* 1.9*   PROT 6.6 6.6 6.4   BILITOT 1.9* 1.2* 0.9   ALKPHOS 46* 48* 44*   ALT 7* 6* 6*   AST 20 20 20   MG  --  1.8 1.9       Coagulation:     Recent Labs  Lab 10/02/17  1651   INR 1.2     Lactic Acid: No results for input(s): LACTATE in the last 48 hours.    Significant Imaging:  I have reviewed all pertinent imaging results/findings within the past 24 hours.

## 2017-10-04 NOTE — PROGRESS NOTES
Wound care assessed buttocks for pressure injuries.  Patient states her dermatology doctor told her she has a build up of toxins in her body that is not released the normal way through the skin and may appear as open lesions.  The patient feels that may be what she has on her buttocks.    Skin intact, red/blanchable.  MARSHALL mattress in progress. Patient able to turn self in bed and stays off buttock.  Critic aid barrier cream applied to buttocks. Plan of care discussed with patient who verbalized understanding.  Discussed plan of care with unit nurse Manuela who verbalized understanding.

## 2017-10-05 VITALS
DIASTOLIC BLOOD PRESSURE: 64 MMHG | SYSTOLIC BLOOD PRESSURE: 107 MMHG | HEIGHT: 62 IN | OXYGEN SATURATION: 86 % | HEART RATE: 92 BPM | TEMPERATURE: 98 F | WEIGHT: 164.88 LBS | BODY MASS INDEX: 30.34 KG/M2 | RESPIRATION RATE: 32 BRPM

## 2017-10-05 LAB
ALBUMIN SERPL BCP-MCNC: 2 G/DL
ALP SERPL-CCNC: 46 U/L
ALT SERPL W/O P-5'-P-CCNC: 7 U/L
ANION GAP SERPL CALC-SCNC: 5 MMOL/L
AST SERPL-CCNC: 20 U/L
BASOPHILS # BLD AUTO: 0.02 K/UL
BASOPHILS NFR BLD: 0.7 %
BILIRUB SERPL-MCNC: 0.8 MG/DL
BUN SERPL-MCNC: 26 MG/DL
CALCIUM SERPL-MCNC: 8 MG/DL
CHLORIDE SERPL-SCNC: 107 MMOL/L
CO2 SERPL-SCNC: 26 MMOL/L
CREAT SERPL-MCNC: 1.4 MG/DL
DIFFERENTIAL METHOD: ABNORMAL
EOSINOPHIL # BLD AUTO: 0.1 K/UL
EOSINOPHIL NFR BLD: 3.2 %
ERYTHROCYTE [DISTWIDTH] IN BLOOD BY AUTOMATED COUNT: 17.5 %
EST. GFR  (AFRICAN AMERICAN): 39 ML/MIN/1.73 M^2
EST. GFR  (NON AFRICAN AMERICAN): 33.8 ML/MIN/1.73 M^2
GLUCOSE SERPL-MCNC: 96 MG/DL
HCT VFR BLD AUTO: 26.6 %
HGB BLD-MCNC: 8.6 G/DL
LYMPHOCYTES # BLD AUTO: 0.5 K/UL
LYMPHOCYTES NFR BLD: 19.1 %
MAGNESIUM SERPL-MCNC: 2.1 MG/DL
MCH RBC QN AUTO: 29.3 PG
MCHC RBC AUTO-ENTMCNC: 32.3 G/DL
MCV RBC AUTO: 91 FL
MONOCYTES # BLD AUTO: 0.4 K/UL
MONOCYTES NFR BLD: 12.8 %
NEUTROPHILS # BLD AUTO: 1.8 K/UL
NEUTROPHILS NFR BLD: 64.2 %
PLATELET # BLD AUTO: 116 K/UL
PMV BLD AUTO: 9.1 FL
POTASSIUM SERPL-SCNC: 4.4 MMOL/L
PROT SERPL-MCNC: 6.6 G/DL
RBC # BLD AUTO: 2.94 M/UL
SODIUM SERPL-SCNC: 138 MMOL/L
WBC # BLD AUTO: 2.82 K/UL

## 2017-10-05 PROCEDURE — 36415 COLL VENOUS BLD VENIPUNCTURE: CPT

## 2017-10-05 PROCEDURE — 25000003 PHARM REV CODE 250: Performed by: HOSPITALIST

## 2017-10-05 PROCEDURE — 97162 PT EVAL MOD COMPLEX 30 MIN: CPT

## 2017-10-05 PROCEDURE — 63600175 PHARM REV CODE 636 W HCPCS: Performed by: INTERNAL MEDICINE

## 2017-10-05 PROCEDURE — 3E0234Z INTRODUCTION OF SERUM, TOXOID AND VACCINE INTO MUSCLE, PERCUTANEOUS APPROACH: ICD-10-PCS | Performed by: INTERNAL MEDICINE

## 2017-10-05 PROCEDURE — C9113 INJ PANTOPRAZOLE SODIUM, VIA: HCPCS | Performed by: HOSPITALIST

## 2017-10-05 PROCEDURE — 99239 HOSP IP/OBS DSCHRG MGMT >30: CPT | Mod: GC,,, | Performed by: INTERNAL MEDICINE

## 2017-10-05 PROCEDURE — 63600175 PHARM REV CODE 636 W HCPCS: Performed by: HOSPITALIST

## 2017-10-05 PROCEDURE — 97166 OT EVAL MOD COMPLEX 45 MIN: CPT

## 2017-10-05 PROCEDURE — 85025 COMPLETE CBC W/AUTO DIFF WBC: CPT

## 2017-10-05 PROCEDURE — 90471 IMMUNIZATION ADMIN: CPT | Performed by: INTERNAL MEDICINE

## 2017-10-05 PROCEDURE — 94761 N-INVAS EAR/PLS OXIMETRY MLT: CPT

## 2017-10-05 PROCEDURE — G0008 ADMIN INFLUENZA VIRUS VAC: HCPCS | Performed by: INTERNAL MEDICINE

## 2017-10-05 PROCEDURE — 97116 GAIT TRAINING THERAPY: CPT

## 2017-10-05 PROCEDURE — 97535 SELF CARE MNGMENT TRAINING: CPT

## 2017-10-05 PROCEDURE — 80053 COMPREHEN METABOLIC PANEL: CPT

## 2017-10-05 PROCEDURE — 25000003 PHARM REV CODE 250: Performed by: STUDENT IN AN ORGANIZED HEALTH CARE EDUCATION/TRAINING PROGRAM

## 2017-10-05 PROCEDURE — 83735 ASSAY OF MAGNESIUM: CPT

## 2017-10-05 PROCEDURE — 90662 IIV NO PRSV INCREASED AG IM: CPT | Performed by: INTERNAL MEDICINE

## 2017-10-05 RX ORDER — PANTOPRAZOLE SODIUM 40 MG/1
40 TABLET, DELAYED RELEASE ORAL 2 TIMES DAILY
Qty: 60 TABLET | Refills: 3 | Status: ON HOLD | OUTPATIENT
Start: 2017-10-05 | End: 2017-10-12 | Stop reason: HOSPADM

## 2017-10-05 RX ORDER — CIPROFLOXACIN 500 MG/1
500 TABLET ORAL EVERY 12 HOURS
Qty: 4 TABLET | Refills: 0 | Status: SHIPPED | OUTPATIENT
Start: 2017-10-05 | End: 2017-10-07

## 2017-10-05 RX ADMIN — FLUTICASONE PROPIONATE 2 SPRAY: 50 SPRAY, METERED NASAL at 08:10

## 2017-10-05 RX ADMIN — IPRATROPIUM BROMIDE 2 PUFF: 17 AEROSOL, METERED RESPIRATORY (INHALATION) at 06:10

## 2017-10-05 RX ADMIN — Medication: at 08:10

## 2017-10-05 RX ADMIN — ONDANSETRON 8 MG: 8 TABLET, ORALLY DISINTEGRATING ORAL at 01:10

## 2017-10-05 RX ADMIN — SERTRALINE HYDROCHLORIDE 25 MG: 25 TABLET ORAL at 08:10

## 2017-10-05 RX ADMIN — INFLUENZA A VIRUSA/MICHIGAN/45/2015 X-275 (H1N1) ANTIGEN (FORMALDEHYDE INACTIVATED), INFLUENZA A VIRUS A/HONG KONG/4801/2014 X-263B (H3N2) ANTIGEN (FORMALDEHYDE INACTIVATED), AND INFLUENZA B VIRUS B/BRISBANE/60/2008 ANTIGEN (FORMALDEHYDE INACTIVATED) 0.5 ML: 60; 60; 60 INJECTION, SUSPENSION INTRAMUSCULAR at 01:10

## 2017-10-05 RX ADMIN — OXYCODONE HYDROCHLORIDE AND ACETAMINOPHEN 500 MG: 500 TABLET ORAL at 08:10

## 2017-10-05 RX ADMIN — PANTOPRAZOLE SODIUM 40 MG: 40 INJECTION, POWDER, FOR SOLUTION INTRAVENOUS at 08:10

## 2017-10-05 RX ADMIN — METOPROLOL TARTRATE 25 MG: 25 TABLET, FILM COATED ORAL at 08:10

## 2017-10-05 NOTE — PLAN OF CARE
[facility]    HOME HEALTH ORDERS  FACE TO FACE ENCOUNTER    Patient Name: Erin Frazier  YOB: 1930    PCP: Alexis Mercado MD   PCP Address: [address] / [place] [address]  PCP Phone Number: [phone]  PCP Fax: [phone]    Encounter Date: 10/05/2017    Admit to Home Health    Diagnoses:  Active Hospital Problems    Diagnosis  POA    *GIB (gastrointestinal bleeding) [K92.2]  Yes    Alteration in skin integrity due to moisture [R23.9]  Yes    Gastrointestinal hemorrhage [K92.2]  Yes    Acute blood loss anemia [D62]  Yes    History of sick sinus syndrome [Z86.79]  Not Applicable    Chronic bilateral low back pain without sciatica [M54.5, G89.29]  Yes    Cirrhosis of liver without ascites [K74.60]  Yes    Chronic diastolic CHF (congestive heart failure) [I50.32]  Yes    Chronic atrial fibrillation [I48.2]  Yes    Essential hypertension [I10]  Yes    History of stroke [Z86.73]  Not Applicable    H/O left hemicolectomy [Z98.890, Z90.49]  Not Applicable    Pacemaker [Z95.0]  Yes      Resolved Hospital Problems    Diagnosis Date Resolved POA   No resolved problems to display.       Future Appointments  Date Time Provider Department Center   10/10/2017 1:40 PM [facility]IC[facility]   10/12/2017 1:40 PM Alexis Mercado MD [facility] FAM MED KAIT ACC   10/30/2017 12:30 PM MARITZA Mehta [facility] ORTHO KAIT GOLD   12/4/2017 10:00 AM PACEMAKER, [facility] 3RD FL   1/8/2018 10:45 AM Sebastian Mora MD [facility] CARDIO KAIT 3RD FL   4/12/2018 1:30 PM Morales Walton MD [facility] CARDIO [facility] 3RD FL   8/13/2018 8:00 AM OPHTHALMOLOGY GENERAL, [facility] KAIT GREEN     I have seen and examined this patient face to face today. My clinical findings that support the need for the home health skilled services and home bound status are the following:  Weakness/numbness causing balance and gait disturbance due to  Weakness/Debility making it taxing to leave home.    Allergies:  Review of patient's allergies indicates:   Allergen Reactions    Chlorhexidine towelette Itching    Norco [hydrocodone-acetaminophen] Itching and Hallucinations       Diet: cardiac diet    Activities: activity as tolerated    Nursing:   SN to complete comprehensive assessment including routine vital signs. Instruct on disease process and s/s of complications to report to MD. Review/verify medication list sent home with the patient at time of discharge  and instruct patient/caregiver as needed. Frequency may be adjusted depending on start of care date.    Notify MD if SBP > 160 or < 90; DBP > 90 or < 50; HR > 120 or < 50; Temp > 101    CONSULTS:    Physical Therapy to evaluate and treat. Evaluate for home safety and equipment needs; Establish/upgrade home exercise program. Perform / instruct on therapeutic exercises, gait training, transfer training, and Range of Motion.  Occupational Therapy to evaluate and treat. Evaluate home environment for safety and equipment needs. Perform/Instruct on transfers, ADL training, ROM, and therapeutic exercises.  Aide to provide assistance with personal care, ADLs, and vital signs.    MISCELLANEOUS CARE:  Colostomy Care:  Instruct patient/caregiver to empty bag when full and PRN., Change and clean site every 48 hours and Monitor skin integrity.    WOUND CARE ORDERS  n/a      Medications: Review discharge medications with patient and family and provide education.      Current Discharge Medication List      START taking these medications    Details   ciprofloxacin HCl (CIPRO) 500 MG tablet Take 1 tablet (500 mg total) by mouth every 12 (twelve) hours.  Qty: 4 tablet, Refills: 0      pantoprazole (PROTONIX) 40 MG tablet Take 1 tablet (40 mg total) by mouth 2 (two) times daily.  Qty: 60 tablet, Refills: 3         CONTINUE these medications which have NOT CHANGED    Details   ascorbic acid (VITAMIN C) 500 MG tablet Take  500 mg by mouth once daily.      fluticasone (FLONASE) 50 mcg/actuation nasal spray TWO SPRAYS IN EACH NOSTRIL EVERY DAY  Qty: 16 g, Refills: 11    Associated Diagnoses: Allergic rhinitis      furosemide (LASIX) 20 MG tablet Take 1 tablet (20 mg total) by mouth once daily.  Qty: 90 tablet, Refills: 3      inhalation device (AEROCHAMBER PLUS FLOW-VU) Use as directed for inhalation with albuterol inhaler.  Qty: 1 Device, Refills: 0    Associated Diagnoses: COPD exacerbation      ipratropium (ATROVENT HFA) 17 mcg/actuation inhaler Inhale 2 puffs into the lungs every 6 (six) hours.      meclizine (ANTIVERT) 25 mg tablet Take 1 tablet (25 mg total) by mouth 2 (two) times daily as needed.  Qty: 20 tablet, Refills: 0      methylcellulose (ARTIFICIAL TEARS) 1 % ophthalmic solution 1 drop as needed.      metoclopramide HCl (REGLAN) 10 MG tablet Take 1/2 to 1 tablet max 4 times a day for bloating (30 min before eating) - may cause fatigue.  Qty: 30 tablet, Refills: 0      metoprolol succinate (TOPROL-XL) 50 MG 24 hr tablet Take 1 tablet (50 mg total) by mouth once daily.  Qty: 90 tablet, Refills: 3    Associated Diagnoses: Chronic atrial fibrillation; Essential hypertension      omega-3 acid ethyl esters (LOVAZA) 1 gram capsule TAKE TWO CAPSULES BY MOUTH TWICE DAILY  Qty: 360 capsule, Refills: 3    Associated Diagnoses: Hyperlipidemia      ondansetron (ZOFRAN-ODT) 8 MG TbDL Take 1 tablet (8 mg total) by mouth every 8 (eight) hours as needed (nausea).  Qty: 30 tablet, Refills: 3    Associated Diagnoses: Nausea      PANTOPRAZOLE SODIUM (PANTOPRAZOLE 40 MG/100 ML D5W, READY TO MIX SYSTEM,) Inject 8 mg/hr into the vein continuous.      ropinirole (REQUIP) 0.5 MG tablet Take 2 tablets (1 mg total) by mouth nightly.  Qty: 180 tablet, Refills: 5      sertraline (ZOLOFT) 50 MG tablet Take 1 tablet (50 mg total) by mouth once daily.  Qty: 90 tablet, Refills: 3    Associated Diagnoses: Mood disorder      DAVID-FIT NATURA DRAINABLE POUCH  "2 3/4 " Misc USE as instructed by physician  José Luis: 40 each, Refills: 11    Associated Diagnoses: Colostomy care; Colostomy in place      VENTOLIN HFA 90 mcg/actuation inhaler          STOP taking these medications       pravastatin (PRAVACHOL) 40 MG tablet Comments:   Reason for Stopping:         promethazine (PHENERGAN) 25 MG tablet Comments:   Reason for Stopping:         ranitidine (ZANTAC) 300 MG tablet Comments:   Reason for Stopping:         zolpidem (AMBIEN) 5 MG Tab Comments:   Reason for Stopping:               I certify that this patient is confined to her home and needs intermittent skilled nursing care, physical therapy and occupational therapy.      "

## 2017-10-05 NOTE — PLAN OF CARE
Problem: Occupational Therapy Goal  Goal: Occupational Therapy Goal  Goals to be met by: 10/19/2017    Patient will increase functional independence with ADLs by performing:    UE Dressing with Modified [place].  LE Dressing with Modified [place].  Grooming while standing with Modified [place].  Toileting from bedside commode with Modified [place] for hygiene and clothing management.   Supine to sit with Modified [place].  Stand pivot transfers with Modified [place].  Toilet transfer to bedside commode with Modified [place].    Outcome: Ongoing (interventions implemented as appropriate)  OT evaluation and Plan of care established 10/5/2017

## 2017-10-05 NOTE — SUBJECTIVE & OBJECTIVE
Interval History/Significant Events: No acute events overnight, awaiting stepdown bed. No further episodes of hematemesis.       Review of Systems   Respiratory: Negative for shortness of breath.    Cardiovascular: Negative for chest pain and palpitations.   Gastrointestinal: Negative for abdominal pain, blood in stool, diarrhea, nausea and vomiting.     Objective:     Vital Signs (Most Recent):  Temp: 97.9 °F (36.6 °C) (10/05/17 0300)  Pulse: 82 (10/05/17 0600)  Resp: (!) 22 (10/05/17 0600)  BP: 121/60 (10/05/17 0500)  SpO2: (!) 92 % (10/05/17 0500) Vital Signs (24h Range):  Temp:  [97.8 °F (36.6 °C)-97.9 °F (36.6 °C)] 97.9 °F (36.6 °C)  Pulse:  [] 82  Resp:  [10-40] 22  SpO2:  [84 %-96 %] 92 %  BP: ()/(54-80) 121/60   Weight: 74.8 kg (164 lb 14.5 oz)  Body mass index is 30.16 kg/m².      Intake/Output Summary (Last 24 hours) at 10/05/17 0634  Last data filed at 10/05/17 0500   Gross per 24 hour   Intake              440 ml   Output              500 ml   Net              -60 ml       Physical Exam   Constitutional: She is oriented to person, place, and time. She appears well-developed. No distress.   HENT:   Mouth/Throat: No oropharyngeal exudate.   Eyes: No scleral icterus.   Cardiovascular: Normal rate, S1 normal, S2 normal and normal heart sounds.  An irregularly irregular rhythm present.   Pulmonary/Chest: Effort normal and breath sounds normal.   Abdominal: Soft. Bowel sounds are normal. There is no tenderness.   LLQ ostomy with dark stool present   Musculoskeletal: She exhibits no edema.   Lymphadenopathy:     She has no cervical adenopathy.   Neurological: She is alert and oriented to person, place, and time.   Skin: Skin is dry. Capillary refill takes less than 2 seconds.   Psychiatric: She has a normal mood and affect.       Vents:     Lines/Drains/Airways     Central Venous Catheter Line                 Percutaneous Central Line Insertion/Assessment - triple lumen  10/02/17 1400 left internal  jugular 2 days          Drain                 Colostomy -- days          Peripheral Intravenous Line                 Peripheral IV - Single Lumen 09/30/17 1231 Left Hand 4 days         Peripheral IV - Single Lumen 10/01/17 1220 Right;Anterior Other 3 days              Significant Labs:    CBC/Anemia Profile:    Recent Labs  Lab 10/04/17  0855 10/04/17  1905 10/04/17  2252   WBC 3.02* 3.07* 2.91*   HGB 8.2* 8.5* 8.4*   HCT 25.8* 26.8* 26.3*   * 132* 117*   MCV 91 93 90   RDW 17.1* 17.3* 17.4*        Chemistries:    Recent Labs  Lab 10/04/17  0310 10/05/17  0254    138   K 4.3 4.4    107   CO2 24 26   BUN 36* 26*   CREATININE 1.5* 1.4   CALCIUM 8.1* 8.0*   ALBUMIN 1.9* 2.0*   PROT 6.4 6.6   BILITOT 0.9 0.8   ALKPHOS 44* 46*   ALT 6* 7*   AST 20 20   MG 1.9 2.1       Coagulation:   No results for input(s): INR, APTT in the last 48 hours.    Invalid input(s): PT  Lactic Acid: No results for input(s): LACTATE in the last 48 hours.    Significant Imaging:  I have reviewed all pertinent imaging results/findings within the past 24 hours.

## 2017-10-05 NOTE — PATIENT INSTRUCTIONS
Discharge Summary/Instructions after an Endoscopic Procedure  Patient Name: Erin Frazier  Patient MRN: [id number]  Patient YOB: 1930 Tuesday, October 03, 2017  Hunter Rascon MD  RESTRICTIONS:  During your procedure today, you received medications for sedation.  These   medications may affect your judgment, balance and coordination.  Therefore,   for 24 hours, you have the following restrictions:   - DO NOT drive a car, operate machinery, make legal/financial decisions,   sign important papers or drink alcohol.    ACTIVITY:  The following day: return to full activity including work, except no heavy   lifting, straining or running for 3 days if polyps were removed.  DIET:  Eat and drink normally unless instructed otherwise.  TREATMENT FOR COMMON SIDE EFFECTS:  - Mild abdominal pain, belching, bloating or excessive gas: rest, eat   lightly and use a heating pad.  - Sore Throat: treat with throat lozenges and/or gargle with warm salt   water.  SYMPTOMS TO WATCH FOR AND REPORT TO YOUR PHYSICIAN:  1. Abdominal pain or bloating, other than gas cramps.  2. Chest pain.  3. Back pain.  4. Chills or fever occurring within 24 hours after the procedure.  5. Rectal bleeding, which would show as bright red, maroon, or black stools.   (A tablespoon of blood from the rectum is not serious, especially if   hemorrhoids are present.)  6. Vomiting.  7. Weakness or dizziness.  8. Because air was used during the procedure, expelling large amounts of air   from your rectum or belching is normal.  9. If a bowel prep was taken, you may not have a bowel movement for 1-3   days.  This is normal.  GO DIRECTLY TO THE EMERGENCY ROOM IF YOU HAVE ANY OF THE FOLLOWING:   Difficulty breathing   Chills and/or fever over 101 F   Persistent vomiting and/or vomiting blood   Severe abdominal pain   Severe chest pain   Black, tarry stools   Bleeding- more than one tablespoon  Your doctor recommends these additional instructions:  If  any biopsies were taken, your doctors clinic will call you in 1 to 2   weeks with any results.  None  For questions, problems or results please call your physician - Hunter Rascon MD at Work:  [phone].  [facility], EMERGENCY ROOM PHONE NUMBER: [phone]  IF A COMPLICATION OR EMERGENCY SITUATION ARISES AND YOU ARE UNABLE TO REACH   YOUR PHYSICIAN - GO DIRECTLY TO THE EMERGENCY ROOM.  Hunter Rascon MD  10/5/2017 2:25:02 AM  This report has been verified and signed electronically.

## 2017-10-05 NOTE — PLAN OF CARE
CM received communication from Dr. Gutierres stating patient would d/c from ICU today and that PT/OT is evaluating for disposition recs currently.  CM learned PT/OT state patient is safe to d/c home with resumption of HH.  CM requested HH orders from Dr. Gutierres.      1000 - CM communciated to covering  that patient would need resumption of HH and that orders were placed.  CM to bedside to speak with patient regarding d/c arrangements, patient states ok to resume HH with [facility] and that she may have transportation home but needs to check with her daughter, Juana Frazier and needs her phone number from the computer.  CM provided Juana Frazier's phone number to patient and explained that this CM would revisit within the hour to discuss transportation needs.  CM will continue to follow.    1112- CM notified by Le, bedside RN, that patient has confirmed family can transport home between 1-2 pm.  Patient appropriate for d/c from case management stand point.      Claire Iniguez, RN, BSN  Case Management  [facility]  Ext. [phone]

## 2017-10-05 NOTE — PROGRESS NOTES
[facility]  Critical Care Medicine  Progress Note    Patient Name: Erin Frazier  MRN: [id number]  Admission Date: 10/2/2017  Hospital Length of Stay: 3 days  Code Status: DNR  Attending Provider: Ron Joseph*  Primary Care Provider: Alexis Mercado MD   Principal Problem: GIB (gastrointestinal bleeding)    Subjective:     HPI:  88 y/o PMH HFpEF, s/p PPM, COPD, afib (no on anticoagulation), cirrhosis, hx of proctosigmoidectomy w/ colostomy after remote diverticulitis, cryptogenic cirrhosis, h/o CVA w/o deficits, HTN who presents as a transfer from [facility] with a chief complaint of hematemesis x1. Was recently admitted and discharged with sepsis 2/2 UTI and after going home reportedly had 1 episode of bright red blood per mouth with some dark clots. Reported similar to a previous episode 2 years ago. At that time was found to have gastric ulcers. She reported some associated nausea, weakness, lightheadedness but no fevers, chills, sweats, CP, abd pain, diarrhea. Reports ostomy output has been normal in volume and caliber. No melena or hematochezia. She reports only 1 episode of hematemesis before presenting. During his admission for sepsis 2/2 UTI he underwent EGD which was negative for a source of bleed but noted bleeding around ostomy which was seen on c-scope which underwent stitch placement by Gen Surg with hemostasis. Admit HGB 5.3 09/30. She was given 4u pRBC total prior to transfer and accepted at [facility] by GI after discussing possible need for capsule endoscopy.        Hospital/ICU Course:  Admitted to ICU for GIB - underwent EGD 10/3 with banding of 1 gastric varix. No further episodes of hematemesis. H/H remained stable. Diet advanced. Abdo U/S did not reveal splenic vein thrombosis. S/p 72 hours of octreotide.     Interval History/Significant Events: No acute events overnight, awaiting stepdown bed. No further episodes of hematemesis.       Review of Systems    Respiratory: Negative for shortness of breath.    Cardiovascular: Negative for chest pain and palpitations.   Gastrointestinal: Negative for abdominal pain, blood in stool, diarrhea, nausea and vomiting.     Objective:     Vital Signs (Most Recent):  Temp: 97.9 °F (36.6 °C) (10/05/17 0300)  Pulse: 82 (10/05/17 0600)  Resp: (!) 22 (10/05/17 0600)  BP: 121/60 (10/05/17 0500)  SpO2: (!) 92 % (10/05/17 0500) Vital Signs (24h Range):  Temp:  [97.8 °F (36.6 °C)-97.9 °F (36.6 °C)] 97.9 °F (36.6 °C)  Pulse:  [] 82  Resp:  [10-40] 22  SpO2:  [84 %-96 %] 92 %  BP: ()/(54-80) 121/60   Weight: 74.8 kg (164 lb 14.5 oz)  Body mass index is 30.16 kg/m².      Intake/Output Summary (Last 24 hours) at 10/05/17 0634  Last data filed at 10/05/17 0500   Gross per 24 hour   Intake              440 ml   Output              500 ml   Net              -60 ml       Physical Exam   Constitutional: She is oriented to person, place, and time. She appears well-developed. No distress.   HENT:   Mouth/Throat: No oropharyngeal exudate.   Eyes: No scleral icterus.   Cardiovascular: Normal rate, S1 normal, S2 normal and normal heart sounds.  An irregularly irregular rhythm present.   Pulmonary/Chest: Effort normal and breath sounds normal.   Abdominal: Soft. Bowel sounds are normal. There is no tenderness.   LLQ ostomy with dark stool present   Musculoskeletal: She exhibits no edema.   Lymphadenopathy:     She has no cervical adenopathy.   Neurological: She is alert and oriented to person, place, and time.   Skin: Skin is dry. Capillary refill takes less than 2 seconds.   Psychiatric: She has a normal mood and affect.       Vents:     Lines/Drains/Airways     Central Venous Catheter Line                 Percutaneous Central Line Insertion/Assessment - triple lumen  10/02/17 1400 left internal jugular 2 days          Drain                 Colostomy -- days          Peripheral Intravenous Line                 Peripheral IV - Single Lumen  09/30/17 1231 Left Hand 4 days         Peripheral IV - Single Lumen 10/01/17 1220 Right;Anterior Other 3 days              Significant Labs:    CBC/Anemia Profile:    Recent Labs  Lab 10/04/17  0855 10/04/17  1905 10/04/17  2252   WBC 3.02* 3.07* 2.91*   HGB 8.2* 8.5* 8.4*   HCT 25.8* 26.8* 26.3*   * 132* 117*   MCV 91 93 90   RDW 17.1* 17.3* 17.4*        Chemistries:    Recent Labs  Lab 10/04/17  0310 10/05/17  0254    138   K 4.3 4.4    107   CO2 24 26   BUN 36* 26*   CREATININE 1.5* 1.4   CALCIUM 8.1* 8.0*   ALBUMIN 1.9* 2.0*   PROT 6.4 6.6   BILITOT 0.9 0.8   ALKPHOS 44* 46*   ALT 6* 7*   AST 20 20   MG 1.9 2.1       Coagulation:   No results for input(s): INR, APTT in the last 48 hours.    Invalid input(s): PT  Lactic Acid: No results for input(s): LACTATE in the last 48 hours.    Significant Imaging:  I have reviewed all pertinent imaging results/findings within the past 24 hours.    Assessment/Plan:     Neuro   History of stroke    -no residual deficits        Cardiac/Vascular   History of sick sinus syndrome    -s/p PPM        Chronic diastolic CHF (congestive heart failure)    -Currently euvolemic  -lasix on hold, can consider restarting soon         Chronic atrial fibrillation    -[id number] 5  -restarted BB 10/4  -Off anticoag 2/2 bleeding events        Essential hypertension    -BB        Pacemaker    -s/p PPM        Oncology   Acute blood loss anemia    -See GIB        GI   * GIB (gastrointestinal bleeding)    -Most likely UGIB, recent EGD 09/26 with normal esophagus, stomach, duodenum   -s/p 4 U prbc at OSH prior to transfer  -h/o cirrhosis, though does not show many stigmata  -Has IJ CVL and PIV x3  -PPI IV BID   -Octreotide gtt x 72 hours  -CTX 1g qd -> will discuss discontinuing today  -GI consulted, s/p EGD 10/3 with banding of gastric varix      - abdo U/S to evaluate for splenic vein thrombosis negative; notable for cholelithiasis with associated GB wall hyperemia         Cirrhosis of liver without ascites    -Unclear etiology, seen on US, possibly WAY  -continue to monitor  -will need OP follow up        H/O left hemicolectomy    -2/2 diverticulitis           Critical Care Daily Checklist:    A: Awake: RASS Goal/Actual Goal: RASS Goal: 0-->alert and calm  Actual: Holt Agitation Sedation Scale (RASS): Alert and calm   B: Spontaneous Breathing Trial Performed?     C: SAT & SBT Coordinated?  NA                      D: Delirium: CAM-ICU Overall CAM-ICU: Negative   E: Early Mobility Performed? Yes, OOB today, PTOT ordered   F: Feeding Goal:    Status:     Current Diet Order   Procedures    Diet Cardiac      AS: Analgesia/Sedation NA   T: Thromboembolic Prophylaxis Mechanical given recent bleed   H: HOB > 300 Yes   U: Stress Ulcer Prophylaxis (if needed) PPI BID   G: Glucose Control monitor   B: Bowel Function Stool Occurrence: 1 (colostomy)   I: Indwelling Catheter (Lines & Keller) Necessity Remove central line prior to stepdown   D: De-escalation of Antimicrobials/Pharmacotherapies Ceftriaxone -> discuss discontinuing    Plan for the day/ETD stepdown    Code Status:  Family/Goals of Care: DNR              CHANDANA Cowan  Critical Care Medicine  [facility]

## 2017-10-05 NOTE — PLAN OF CARE
ANEL faxed through [facility] to resume [facility].    Lakesha Flynn, [facility]  [phone]

## 2017-10-05 NOTE — HOSPITAL COURSE
Admitted to ICU for GIB - underwent EGD 10/3 with banding of 1 gastric varix. No further episodes of hematemesis. H/H remained stable. Diet advanced. Abdo U/S did not reveal splenic vein thrombosis. S/p 72 hours of octreotide.

## 2017-10-05 NOTE — PT/OT/SLP EVAL
Physical Therapy  Evaluation    Erin Frazier   MRN: [id number]   Admitting Diagnosis: GIB (gastrointestinal bleeding)    PT Received On: 10/05/17  PT Start Time: 0905     PT Stop Time: 0930    PT Total Time (min): 25 min       Billable Minutes:  Evaluation 10 and Gait [id number]   Co-eval with OT    Diagnosis: GIB (gastrointestinal bleeding)  S/p EDG 10/3/2017    Past Medical History:   Diagnosis Date    Abdominal pain 3/22/2017    Anemia 3/10/2015    Anxiety     Arthritis     Asthma     Atrial fibrillation     Cataract     CHF (congestive heart failure)     Colostomy in place     COPD (chronic obstructive pulmonary disease)     Depression     Diverticula of intestine     Encounter for blood transfusion     GERD (gastroesophageal reflux disease)     Heart disease     Hyperlipemia     Hypertension     Joint pain     Migraine headache     Moll's gland cyst     Pacemaker     Pacemaker 2001    Rt chest wall.     Recurrent pancreatitis 11/18/2014    Renal disorder       Past Surgical History:   Procedure Laterality Date    BLADDER DIVERTICULECTOMY      BLADDER SUSPENSION      CARDIAC PACEMAKER PLACEMENT      CARDIAC PACEMAKER PLACEMENT      CATARACT EXTRACTION      ou    COLONOSCOPY N/A 10/3/2016    Procedure: COLONOSCOPY;  Surgeon: Beverley Guerrero MD;  Location: [facility];  Service: Endoscopy;  Laterality: N/A;    HEMORRHOID SURGERY      HYSTERECTOMY      SIGMOIDECTOMY  after crissy >10yrs    with end colostomy     TONSILLECTOMY         Referring physician: Elza Gutierres   Date referred to PT: 10/4/2017    General Precautions: Standard, fall  Orthopedic Precautions: N/A   Braces: N/A       Do you have any cultural, spiritual, Scientologist conflicts, given your current situation?: none reported     Patient History:  Son not currently working and available to care for pt at home.   Lives With: child(marshal), adult (son's wife)  Living Arrangements: house  Home Accessibility: stairs to  enter home  Home Layout: Able to live on 1st floor  Number of Stairs to Enter Home: 3  Stair Railings at Home: none  Living Environment Comment: Pt lives in [facility] with son and son's wife with 3 TERESA to enter front and back and no HR. Pt uses rollator for ambulation and transfers. Pt had home health aid and home health PT prior to admit.   Equipment Currently Used at Home: bedside commode, rollator, walker, rolling, hospital bed, wheelchair  DME owned (not currently used): none    Previous Level of Function:  Ambulation Skills: needs device  Transfer Skills: needs device    Subjective:  Communicated with nsg prior to session. Pt agreeable to session     Chief Complaint: wanting to get OOB  Patient goals: to return home     Pain/Comfort  Pain Rating 1: 0/10  Pain Rating Post-Intervention 1: 0/10      Objective:   Patient found with: telemetry, pulse ox (continuous), blood pressure cuff, peripheral IV, bowel management system     Cognitive Exam:  Oriented to: Person, Place, Time and Situation    Follows Commands/attention: Follows multistep  commands  Communication: clear/fluent  Safety awareness/insight to disability: intact    Physical Exam:  Sensation:   Intact B LE light touch; no numbness reported     Lower Extremity Range of Motion:  Right Lower Extremity: WFL  Left Lower Extremity: WFL    Lower Extremity Strength:  Right Lower Extremity: WFL  Left Lower Extremity: WFL      Gross motor coordination: WFL; B LE alternating toe taps     Functional Mobility:  Bed Mobility:  Supine to Sit: Contact Guard Assistance    Transfers:  Sit <> Stand Assistance: Supervision  Sit <> Stand Assistive Device: Rolling Walker    Gait:   Gait Distance: ~20 ft from bed>door>chair  Assistance 1: Contact Guard Assistance  Gait Assistive Device: Rolling walker  Gait Pattern: reciprocal  Gait Deviation(s): decreased tray, decreased step length, forward lean, increased time in double stance, decreased velocity of limb motion     Pt with 1  LOB needing min A to correct second to R foot starting to hurt 2nd to arthritis.      Balance:   Static Sit: GOOD: Takes MODERATE challenges from all directions  Dynamic Sit: GOOD: Maintains balance through MODERATE excursions of active trunk movement  Static Stand: GOOD-: Takes MODERATE challenges from all directions inconsistently  Dynamic stand: GOOD-: Needs SUPERVISION only during gait and able to self right with moderate     Therapeutic Activities and Exercises:  Educated pt on safe functional mobility with RW. Pt verbalized understanding.    AM-PAC 6 CLICK MOBILITY  How much help from another person does this patient currently need?   1 = Unable, Total/Dependent Assistance  2 = A lot, Maximum/Moderate Assistance  3 = A little, Minimum/Contact Guard/Supervision  4 = None, Modified [place]/Independent    Turning over in bed (including adjusting bedclothes, sheets and blankets)?: 3  Sitting down on and standing up from a chair with arms (e.g., wheelchair, bedside commode, etc.): 3  Moving from lying on back to sitting on the side of the bed?: 3  Moving to and from a bed to a chair (including a wheelchair)?: 3  Need to walk in hospital room?: 3  Climbing 3-5 steps with a railing?: 2  Total Score: 17     AM-PAC Raw Score CMS G-Code Modifier Level of Impairment Assistance   6 % Total / Unable   7 - 9 CM 80 - 100% Maximal Assist   10 - 14 CL 60 - 80% Moderate Assist   15 - 19 CK 40 - 60% Moderate Assist   20 - 22 CJ 20 - 40% Minimal Assist   23 CI 1-20% SBA / CGA   24 CH 0% Independent/ Mod I     Patient left up in chair with all lines intact and call button in reach.    Assessment:   Erin Frazier is a 87 y.o. female with a medical diagnosis of GIB (gastrointestinal bleeding) and presents with impaired functional mobility 2nd to decreased balance, decreased gait distance, decreased transfers, and decreased endurance. Pt requires RW for safe ambulation. Pt would benefit from skilled PT to address  listed impairments, return to PLOF, and practice ascending/descending stairs. Pt would also benefit from resuming HHPT upon d/c to ensure safety and increase endurance upon return to home with 3 TERESA to enter and no Hr.     Rehab identified problem list/impairments: Rehab identified problem list/impairments: weakness, impaired endurance, impaired self care skills, impaired functional mobilty, gait instability, impaired balance, pain    Rehab potential is good.    Activity tolerance: Good    Discharge recommendations: Discharge Facility/Level Of Care Needs: home with home health     Barriers to discharge: Barriers to Discharge: Inaccessible home environment (3 TERESA to enter from and back and no HR)    Equipment recommendations: Equipment Needed After Discharge:  (pt owns required equiptment )     GOALS:    Physical Therapy Goals        Problem: Physical Therapy Goal    Goal Priority Disciplines Outcome Goal Variances Interventions   Physical Therapy Goal     PT/OT, PT Ongoing (interventions implemented as appropriate)     Description:  Goals to be met by: 10/12/2017    Patient will increase functional independence with mobility by performin. Supine to sit with [place]  2. Sit to stand transfer with Modified [place]  3. Gait  x 100 feet with Modified [place] using Rolling Walker.   4. Ascend/descend 3 Supervision using LRAD or no Ad.                          PLAN:    Patient to be seen 4 x/week to address the above listed problems via gait training, therapeutic activities, therapeutic exercises, neuromuscular re-education  Plan of Care expires: 17  Plan of Care reviewed with: patient          Charisse Quintana, PT  10/05/2017

## 2017-10-05 NOTE — PT/OT/SLP EVAL
Occupational Therapy  Evaluation    Erin Frazier   MRN: [id number]   Admitting Diagnosis: GIB (gastrointestinal bleeding)    OT Date of Treatment: 10/05/17   OT Start Time: 0905  OT Stop Time: 0930  OT Total Time (min): 25 min    Billable Minutes:  Evaluation 10  Self Care/Home Management 15    Diagnosis: GIB (gastrointestinal bleeding)     Past Medical History:   Diagnosis Date    Abdominal pain 3/22/2017    Anemia 3/10/2015    Anxiety     Arthritis     Asthma     Atrial fibrillation     Cataract     CHF (congestive heart failure)     Colostomy in place     COPD (chronic obstructive pulmonary disease)     Depression     Diverticula of intestine     Encounter for blood transfusion     GERD (gastroesophageal reflux disease)     Heart disease     Hyperlipemia     Hypertension     Joint pain     Migraine headache     Moll's gland cyst     Pacemaker     Pacemaker 2001    Rt chest wall.     Recurrent pancreatitis 11/18/2014    Renal disorder       Past Surgical History:   Procedure Laterality Date    BLADDER DIVERTICULECTOMY      BLADDER SUSPENSION      CARDIAC PACEMAKER PLACEMENT      CARDIAC PACEMAKER PLACEMENT      CATARACT EXTRACTION      ou    COLONOSCOPY N/A 10/3/2016    Procedure: COLONOSCOPY;  Surgeon: Beverley Guerrero MD;  [facility];  Service: Endoscopy;  Laterality: N/A;    HEMORRHOID SURGERY      HYSTERECTOMY      SIGMOIDECTOMY  after crissy >10yrs    with end colostomy     TONSILLECTOMY         Referring physician: Nenita (CHANDANA)  Date referred to OT:  10/4/2017    General Precautions: Standard, fall     Patient History:  Living Environment  Lives With:  (son and daughter in law )  Living Arrangements: house  Home Accessibility: stairs to enter home  Home Layout: Able to live on 1st floor  Number of Stairs to Enter Home: 3  Stair Railings at Home: none  Transportation Available: car, family or friend will provide  Living Environment Comment: Pt lives in a  [facility] with her son and daughter in law, 3 steps to enter home with no HR. Pt uses rollator for  ambulation. Pt has home health aid and home  health PT prior to admission   Equipment Currently Used at Home: bedside commode, rollator, walker, rolling, wheelchair, hospital bed    Prior level of function:   Bed Mobility/Transfers: independent  Grooming: independent  Bathing: independent  Upper Body Dressing: independent  Lower Body Dressing: independent  Toileting: independent  Home Management Skills: independent        Dominant hand: left    Subjective:  Communicated with RN prior to session.  Chief Complaint: Arthritic pains   Patient/Family stated goals: "To get better and go home"- Pt agreeable to OT session   Pain/Comfort: not rated, arthritic pain   Pain Rating 1: 0/10    Objective:  Patient found reclined in bed with: blood pressure cuff, pulse ox (continuous), telemetry, peripheral IV, central line (colostomy )    Cognitive Exam:  Oriented to: Person, Place, Time and Situation  Follows Commands/attention: Follows two-step commands  Communication: clear/fluent  Memory:  No Deficits noted  Safety awareness/insight to disability: intact  Coping skills/emotional control: Appropriate to situation    Visual/perceptual:  Intact    Physical Exam:  Postural examination/scapula alignment: Rounded shoulder, Head forward and Kyphosis  Skin integrity: Visible skin intact  Edema: Mild Bilateral LE edema     Sensation:   Intact    Upper Extremity Range of Motion:  Right Upper Extremity: grossly WFL crepidus at endrange of shoulder flex/abd   Left Upper Extremity: Grossly WFL credipus at endrange of shoudler flex/abd    Upper Extremity Strength:  Right Upper Extremity: grossly 3+/5 throughout  Left Upper Extremity: grossly 3+/5 throughout   Strength: Bilateral  3+/5    Fine motor coordination:   Intact  Left hand, finger to nose, Right hand, finger to nose, Left hand thumb/finger opposition skills, Right hand thumb/finger " "opposition skills, Left hand, manipulation of objects and Right hand, manipulation of objects    Gross motor coordination: WFL    Functional Mobility:  Bed Mobility:  Rolling/Turning to Left: Supervision  Rolling/Turning Right: Supervision  Scooting/Bridging: Contact Guard Assistance  Supine to Sit: Contact Guard Assistance    Transfers:  Sit <> Stand Assistance: Contact Guard Assistance  Sit <> Stand Assistive Device: Rolling Walker  Bed <> Chair Technique: Stand Pivot  Bed <> Chair Transfer Assistance: Contact Guard Assistance  Bed <> Chair Assistive Device: Rolling Walker    Functional Ambulation: Pt walked ~ 20 feet in hallway with Min Assist    Activities of Daily Living:     LE Dressing Level of Assistance: Moderate assistance (donning socks long sitting in bed )  Grooming Position: Standing  Grooming Level of Assistance: Supervision    Balance:   Static Sit: Good   Dynamic Sit: Good   Static Stand: Fair+   Dynamic stand: Fair     Therapeutic Activities and Exercises:  Bed mobility, sit to stand with CGA, bed to chair, functional mobility retraining   Pt educated on role of OT, plan of care, safety awareness, DME, importance of out of bed activity, energy conservation techniques       AM-PAC 6 CLICK ADL  How much help from another person does this patient currently need?  1 = Unable, Total/Dependent Assistance  2 = A lot, Maximum/Moderate Assistance  3 = A little, Minimum/Contact Guard/Supervision  4 = None, Modified [place]/Independent    Putting on and taking off regular lower body clothing? : 3  Bathing (including washing, rinsing, drying)?: 2  Toileting, which includes using toilet, bedpan, or urinal? : 3  Putting on and taking off regular upper body clothing?: 3  Taking care of personal grooming such as brushing teeth?: 4  Eating meals?: 4  Total Score: 19    AM-PAC Raw Score CMS "G-Code Modifier Level of Impairment Assistance   6 % Total / Unable   7 - 9 CM 80 - 100% Maximal Assist   10-14 CL " 60 - 80% Moderate Assist   15 - 19 CK 40 - 60% Moderate Assist   20 - 22 CJ 20 - 40% Minimal Assist   23 CI 1-20% SBA / CGA   24 CH 0% Independent/ Mod I       Patient left up in chair with all lines intact, call button in reach and rn notified    Assessment:  Erin Frazier is a 87 y.o. female with a medical diagnosis of GIB (gastrointestinal bleeding) and presents with decreased activity tolerance impacting indep and safety with ADL/Self care and functional mobility. Pt will benefit from skilled OT services to maximize independence and safety with ADL/Self care and functional mobility.     Rehab identified problem list/impairments: Rehab identified problem list/impairments: weakness, impaired endurance, impaired self care skills, impaired functional mobilty, impaired balance, gait instability, impaired cardiopulmonary response to activity    Rehab potential is good.    Activity tolerance: Good    Discharge recommendations: Discharge Facility/Level Of Care Needs: home with home health, home health PT, home health OT     Barriers to discharge: Barriers to Discharge: Inaccessible home environment (3 TERESA )    Equipment recommendations:  (Pt owns all necessary DME at this time. No additional equipment medically necessary at this time)     GOALS:    Occupational Therapy Goals        Problem: Occupational Therapy Goal    Goal Priority Disciplines Outcome Interventions   Occupational Therapy Goal     OT, PT/OT Ongoing (interventions implemented as appropriate)    Description:  Goals to be met by: 10/19/2017    Patient will increase functional independence with ADLs by performing:    UE Dressing with Modified [place].  LE Dressing with Modified [place].  Grooming while standing with Modified [place].  Toileting from bedside commode with Modified [place] for hygiene and clothing management.   Supine to sit with Modified [place].  Stand pivot transfers with Modified [place].  Toilet  transfer to bedside commode with Modified [place].                      PLAN:  Patient to be seen 3 x/week to address the above listed problems via self-care/home management, therapeutic activities, therapeutic exercises  Plan of Care expires: 10/19/17  Plan of Care reviewed with: patient         Hind S LUCIA Hanson  10/05/2017

## 2017-10-05 NOTE — NURSING TRANSFER
Nursing Transfer Note      10/5/2017     Transfer To: Front of ED From: 3081    Transfer via wheelchair    Transported by Remedois Hinkle, NURYS    Medicines sent: Meds to be picked up at pharmacy    Chart send with patient: No    Notified: daughter who was with patient    Patient reassessed at: No reassessment. Pt. Discharged

## 2017-10-05 NOTE — PLAN OF CARE
Problem: Physical Therapy Goal  Goal: Physical Therapy Goal  Goals to be met by: 10/12/2017    Patient will increase functional independence with mobility by performin. Supine to sit with [place]  2. Sit to stand transfer with Modified [place]  3. Gait  x 100 feet with Modified [place] using Rolling Walker.   4. Ascend/descend 3 Supervision using LRAD or no Ad.        Outcome: Ongoing (interventions implemented as appropriate)  Eval completed and goals appropriate.

## 2017-10-05 NOTE — PLAN OF CARE
Problem: Occupational Therapy Goal  Goal: Occupational Therapy Goal  Goals to be met by: 10/19/2017    Patient will increase functional independence with ADLs by performing:    UE Dressing with Modified [place].  LE Dressing with Modified [place].  Grooming while standing with Modified [place].  Toileting from bedside commode with Modified [place] for hygiene and clothing management.   Supine to sit with Modified [place].  Stand pivot transfers with Modified [place].  Toilet transfer to bedside commode with Modified [place].    Outcome: Ongoing (interventions implemented as appropriate)  OT evaluation and Plan of care established 10/5/2017

## 2017-10-05 NOTE — PLAN OF CARE
Problem: Patient Care Overview  Goal: Plan of Care Review  Outcome: Ongoing (interventions implemented as appropriate)  Explained to patient the signs and symptoms of bleeding such as weakness, dizziness, low blood pressure and elevated heart rate. Patient verbalized understanding.

## 2017-10-06 ENCOUNTER — PATIENT OUTREACH (OUTPATIENT)
Dept: ADMINISTRATIVE | Facility: CLINIC | Age: 82
End: 2017-10-06

## 2017-10-06 NOTE — PATIENT INSTRUCTIONS
When You Have Gastrointestinal (GI) Bleeding    Blood in your vomit or stool can be a sign of gastrointestinal (GI) bleeding. GI bleeding can be scary. But the cause may not be serious. You should always see a doctor if GI bleeding occurs.  The GI tract  The GI tract is the path through which food travels in the body. Food passes from the mouth down the esophagus (the tube from the mouth to the stomach). Food begins to break down in the stomach. It then moves through the duodenum, the first part of the small intestine. Nutrients are absorbed as food travels through the small intestine. What is left passes into the colon (large intestine) as waste. The colon removes water from the waste. Waste continues from the colon to the rectum (where stool is stored). Waste then leaves the body through the anus.  Causes of GI bleeding  GI bleeding can be caused by many different problems. Some of the more common causes include:  · Swollen veins in the anus (hemorrhoids)  · Swollen veins in the esophagus (varices)  · Sore on the lining of the GI tract (ulcer)  · Cuts or scrapes in the mouth or throat  · Infection caused by germs such as bacteria or parasites  · Food allergies, such as milk allergy in young children  · Medicines  · Inflammation of the GI tract (gastritis or esophagitis)  · Colitis (Crohn's disease or ulcerative colitis)  · Cancer (tumors or polyps)  · Abnormal pouches in the colon (diverticula)  · Tears in the esophagus or anus  · Nosebleed  · Abnormal blood vessels in the GI tract (angiodysplasia)  Diagnosing the cause of blood in stool  If blood is coming out in your stool, you may have a lower GI tract problem or a very fast upper GI tract bleed. Bleeding from the GI tract can be bright red. Or it may look dark and tarry. Tests may also find blood in your stool that cant be seen with the eye (occult blood). To find out the cause, tests that may be ordered include:  · Blood tests. A blood sample is taken and  sent to a lab for exam.  · Hemoccult test. Checks a stool sample for blood.  · Stool culture. Checks a stool sample for bacteria or parasites.  · X-ray, ultrasound, or CT scan. Imaging tests that take pictures of the digestive tract.  · Colonoscopy or sigmoidoscopy. This test uses a flexible tube with a tiny camera. The tube is inserted through your anus into your rectum to see the inside of your colon. Your provider can also take a tiny tissue sample (biopsy) and treat a bleeding source  Diagnosing the cause of blood in vomit  If you are vomiting blood or something that looks like coffee grounds, you may have an upper GI tract problem. To find the cause, tests that may be done include:  · Upper Endoscopy. A flexible tube with a tiny camera is inserted through your mouth and throat to see inside your upper GI tract. This lets your provider take a tiny tissue sample (biopsy) and treat a bleeding source.  · Nasogastric lavage. This can tell if you have upper GI or lower GI bleeding.  · X-ray, ultrasound, or CT scan. Imaging tests that take pictures of your digestive tract.  · Upper GI series. X-rays of the upper part of your GI tract taken from inside your body.  · Enteroscopy. This sends a flexible tube or a small, swallowed capsule camera into your small intestine.  When to call your healthcare provider  Call your healthcare provider right away if you have any of the following:  · Bleeding from your mouth or anus that can't be stopped  · Fever of 100.4°F (38.0°) or higher  · Bleeding along with feeling lightheaded or dizzy  · Signs of fluid loss (dehydration). These include a dry, sticky mouth, decreased urine output; and very dark urine.  · Belly (abdominal) pain   Date Last Reviewed: 7/1/2016  © 1683-6436 [employer]. [address] [place] [address], [place] [address]. All rights reserved. This information is not intended as a substitute for professional medical care. Always follow your healthcare  professional's instructions.

## 2017-10-06 NOTE — PT/OT/SLP DISCHARGE
Occupational Therapy Discharge Summary    Erin Frazier  MRN: [id number]   GIB (gastrointestinal bleeding)   Patient Discharged from acute Occupational Therapy on 10/5/2017  Please refer to prior OT note dated on 10/5/2017 for functional status.     Assessment:   Patient was discharge unexpectedly.  Information required to complete and accurate discharge summary is unknown.  Refer to therapy initial evaluation and last progress note for initial and most recent functional status and goal achievement.  Recommendations made may be found in medical record.  GOALS:    Occupational Therapy Goals        Problem: Occupational Therapy Goal    Goal Priority Disciplines Outcome Interventions   Occupational Therapy Goal     OT, PT/OT Ongoing (interventions implemented as appropriate)    Description:  Goals to be met by: 10/19/2017    Patient will increase functional independence with ADLs by performing:    UE Dressing with Modified [place].  LE Dressing with Modified [place].  Grooming while standing with Modified [place].  Toileting from bedside commode with Modified [place] for hygiene and clothing management.   Supine to sit with Modified [place].  Stand pivot transfers with Modified [place].  Toilet transfer to bedside commode with Modified [place].                    Reasons for Discontinuation of Therapy Services  Transfer to alternate level of care.      Plan:  Patient Discharged to: Home with Home Health Service.

## 2017-10-06 NOTE — DISCHARGE SUMMARY
[facility]  Critical Care Medicine  Discharge Summary      Patient Name: Erin Frazier  MRN: [id number]  Admission Date: 10/2/2017  Hospital Length of Stay: 3 days  Discharge Date and Time:  10/05/2017  Attending Physician: No att. 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 Was recently admitted and discharged with sepsis 2/2 UTI and after going home reportedly had 1 episode of bright red blood per mouth with some dark clots. Reported similar to a previous episode 2 years ago. At that time was found to have gastric ulcers. She reported some associated nausea, weakness, lightheadedness but no fevers, chills, sweats, CP, abd pain, diarrhea. Reports ostomy output has been normal in volume and caliber. No melena or hematochezia. She reports only 1 episode of hematemesis before presenting. During his admission for sepsis 2/2 UTI he underwent EGD which was negative for a source of bleed but noted bleeding around ostomy which was seen on c-scope which underwent stitch placement by Gen Surg with hemostasis. Admit HGB 5.3 09/30. She was given 4u pRBC total prior to transfer and accepted at [facility] by GI after discussing possible need for capsule endoscopy.        Procedure(s) (LRB):  ESOPHAGOGASTRODUODENOSCOPY (EGD) (N/A)    Indwelling Lines/Drains at Time of Discharge:   Lines/Drains/Airways     Drain                 Colostomy -- days              Hospital Course:   Admitted to ICU for GIB - underwent EGD 10/3 with banding of 1 gastric varix. No further episodes of hematemesis. H/H remained stable. Diet advanced. Abdo U/S did not  reveal splenic vein thrombosis. S/p 72 hours of octreotide. Discharged home with PPI and cipro x 2 days to complete 5 day therapy. Will need follow up with GI as outpatient.     Consults         Status Ordering Provider     Inpatient consult to Gastroenterology  Once     Provider:  (Not yet assigned)    Completed JENNIFER [facility] IV        Significant Labs:  CMP:   Recent Labs  Lab 10/04/17  0310 10/05/17  0254    138   K 4.3 4.4    107   CO2 24 26    96   BUN 36* 26*   CREATININE 1.5* 1.4   CALCIUM 8.1* 8.0*   PROT 6.4 6.6   ALBUMIN 1.9* 2.0*   BILITOT 0.9 0.8   ALKPHOS 44* 46*   AST 20 20   ALT 6* 7*   ANIONGAP 8 5*   EGFRNONAA 31.1* 33.8*     Coagulation: No results for input(s): INR, APTT in the last 48 hours.    Invalid input(s): PT    Significant Imaging:  I have reviewed all pertinent imaging results/findings within the past 24 hours.    Pending Diagnostic Studies:     Procedure Component Value Units Date/Time    CBC auto differential [[id number]] Collected:  10/04/17 0310    Order Status:  Sent Lab Status:  No result     Specimen:  Blood from Blood         Final Active Diagnoses:    Diagnosis Date Noted POA    PRINCIPAL PROBLEM:  GIB (gastrointestinal bleeding) [K92.2] 10/02/2017 Yes    Alteration in skin integrity due to moisture [R23.9] 10/04/2017 Yes    Gastrointestinal hemorrhage [K92.2]  Yes    Acute blood loss anemia [D62] 08/24/2017 Yes    History of sick sinus syndrome [Z86.79] 06/08/2017 Not Applicable    Chronic bilateral low back pain without sciatica [M54.5, G89.29] 04/07/2017 Yes    Cirrhosis of liver without ascites [K74.60] 04/07/2017 Yes    Chronic diastolic CHF (congestive heart failure) [I50.32] 01/06/2016 Yes    Chronic atrial fibrillation [I48.2] 12/23/2015 Yes    Essential hypertension [I10] 12/23/2015 Yes    History of stroke [Z86.73] 12/23/2015 Not Applicable    H/O left hemicolectomy [Z98.890, Z90.49] 11/18/2014 Not Applicable    Pacemaker [Z95.0]  11/18/2014 Yes      Problems Resolved During this Admission:    Diagnosis Date Noted Date Resolved POA     No new Assessment & Plan notes have been filed under this hospital service since the last note was generated.  Service: Critical Care Medicine    Discharged Condition: stable    Disposition: Home-Health Care [facility]    Follow-up Information     Alexis Mercado MD. Go on 10/12/2017.    Specialty:  Internal Medicine  Why:  Hospital follow up  Contact information:  [address] [id number] [phone]                 Patient Instructions:   -instructed to go to ED if SOB, dizzy, CP, weak etc.      Medications:  Reconciled Home Medications:   Discharge Medication List as of 10/5/2017  1:38 PM      START taking these medications    Details   ciprofloxacin HCl (CIPRO) 500 MG tablet Take 1 tablet (500 mg total) by mouth every 12 (twelve) hours., Starting Thu 10/5/2017, Until Sat 10/7/2017, Normal      pantoprazole (PROTONIX) 40 MG tablet Take 1 tablet (40 mg total) by mouth 2 (two) times daily., Starting Thu 10/5/2017, Until Fri 10/5/2018, Normal         CONTINUE these medications which have NOT CHANGED    Details   ascorbic acid (VITAMIN C) 500 MG tablet Take 500 mg by mouth once daily., Until Discontinued, Historical Med      fluticasone (FLONASE) 50 mcg/actuation nasal spray TWO SPRAYS IN EACH NOSTRIL EVERY DAY, Normal      furosemide (LASIX) 20 MG tablet Take 1 tablet (20 mg total) by mouth once daily., Starting Wed 9/27/2017, Until Thu 9/27/2018, Normal      inhalation device (AEROCHAMBER PLUS FLOW-VU) Use as directed for inhalation with albuterol inhaler., Print      ipratropium (ATROVENT HFA) 17 mcg/actuation inhaler Inhale 2 puffs into the lungs every 6 (six) hours., Until Discontinued, Historical Med      meclizine (ANTIVERT) 25 mg tablet Take 1 tablet (25 mg total) by mouth 2 (two) times daily as needed., Starting Wed 7/5/2017, Print      methylcellulose (ARTIFICIAL TEARS) 1 % ophthalmic  "solution 1 drop as needed., Until Discontinued, Historical Med      metoclopramide HCl (REGLAN) 10 MG tablet Take 1/2 to 1 tablet max 4 times a day for bloating (30 min before eating) - may cause fatigue., Print      metoprolol succinate (TOPROL-XL) 50 MG 24 hr tablet Take 1 tablet (50 mg total) by mouth once daily., Starting 5/11/2017, Until Discontinued, Normal      omega-3 acid ethyl esters (LOVAZA) 1 gram capsule TAKE TWO CAPSULES BY MOUTH TWICE DAILY, Normal      ondansetron (ZOFRAN-ODT) 8 MG TbDL Take 1 tablet (8 mg total) by mouth every 8 (eight) hours as needed (nausea)., Starting Sat 8/26/2017, Print      PANTOPRAZOLE SODIUM (PANTOPRAZOLE 40 MG/100 ML D5W, READY TO MIX SYSTEM,) Inject 8 mg/hr into the vein continuous., Starting Mon 10/2/2017, No Print      ropinirole (REQUIP) 0.5 MG tablet Take 2 tablets (1 mg total) by mouth nightly., Starting 5/15/2017, Until Discontinued, Normal      sertraline (ZOLOFT) 50 MG tablet Take 1 tablet (50 mg total) by mouth once daily., Starting 1/23/2017, Until Discontinued, Normal      DAVID-FIT NATURA DRAINABLE POUCH 2 3/4 " Misc USE as instructed by physician, Normal      VENTOLIN HFA 90 mcg/actuation inhaler Starting Mon 6/19/2017, Historical Med         STOP taking these medications       pravastatin (PRAVACHOL) 40 MG tablet Comments:   Reason for Stopping:         promethazine (PHENERGAN) 25 MG tablet Comments:   Reason for Stopping:         ranitidine (ZANTAC) 300 MG tablet Comments:   Reason for Stopping:         zolpidem (AMBIEN) 5 MG Tab Comments:   Reason for Stopping:                CHANDANA Cowan  Critical Care Medicine  [facility]  "

## 2017-10-09 ENCOUNTER — OUTPATIENT CASE MANAGEMENT (OUTPATIENT)
Dept: ADMINISTRATIVE | Facility: OTHER | Age: 82
End: 2017-10-09

## 2017-10-09 NOTE — PROGRESS NOTES
This CSW contacted patient caregiver regarding referral. Caregiver Anna reports she provides care for patient . Patient has active HH in the home. Caregiver stated there were no needs at this time.

## 2017-10-11 PROBLEM — K94.01 BLEEDING FROM COLOSTOMY STOMA: Status: ACTIVE | Noted: 2017-10-11

## 2017-10-16 ENCOUNTER — PATIENT OUTREACH (OUTPATIENT)
Dept: ADMINISTRATIVE | Facility: CLINIC | Age: 82
End: 2017-10-16

## 2017-10-16 RX ORDER — PANTOPRAZOLE SODIUM 40 MG/1
40 TABLET, DELAYED RELEASE ORAL 2 TIMES DAILY
COMMUNITY

## 2017-10-16 NOTE — PROGRESS NOTES
C3 nurse attempted to contact patient. No answer. The following message was left for the patient to return the call:  Good morning  I am a nurse calling on behalf of [facility] from the [facility].  This is a Transitional Care Call for Erin Frazier  . When you have a moment please contact us at [phone] or [phone] Monday through Friday, between the hours of 8 am to 4 pm. We look forward to speaking with you. On behalf of [facility] have a nice day.    The patient has a scheduled HOSFU appointment with Dr Mercado on 10/27/17 @ 1440 hrs.

## 2017-10-16 NOTE — PATIENT INSTRUCTIONS
Discharge Instructions for Colostomy  You just underwent a procedure that required a colostomy. This is a life-saving procedure that involves removing or disconnecting part of your colon (large intestine). If your large intestine was diseased, your healthcare provider may have removed it. If it was injured, your healthcare provider may have disconnected it for a short time so that it can heal. After it heals, your healthcare provider may reconnect it. During a colostomy formation, your healthcare provider reroutes your colon through your abdominal wall. Stool and mucus can then pass out of your body through this opening, called a stoma. The following are general guidelines for home care following a colostomy. Your healthcare provider will go over any information that is specific to your condition.  Home care  Suggestions for home care include the following:  · Take care of your stoma as directed. Your healthcare provider and ostomy nurse discussed how to do this with you before you left the hospital.  · Ask your healthcare provider or ostomy nurse for a patient education sheet about colostomy care before you leave the hospital. This will help remind you how to care for yourself.  · If a partner or significant other will be helping you recover, ask the medical team to educate that person on ostomy care as well.   · Dont lift anything heavier than 5 pounds until your healthcare provider says it is OK.  · Dont drive until after your first healthcare providers appointment after your surgery.  · If you ride in a car for more than short trips, stop often to stretch your legs.  · Ask your healthcare provider when you can expect to return to work. Most patients are able to return to work within 4 to 6 weeks after surgery.  · Increase your activity gradually. Take short walks on a level surface.  · Wash your incision site with soap and water and pat it dry.  · Check your incision every day for redness, drainage, swelling,  or separation of the skin.  · Take your medicines exactly as directed. Dont skip doses.  · Dont take any over-the-counter medicine unless your healthcare provider tells you to do so.  Call your healthcare provider  Call your healthcare provider immediately if you have any of the following:  · Excessive bleeding from your stoma  · Blood in your stool  · Stool that is very hard  · No gas or stool  · Change in the color of your stoma  · Bulging skin around your stoma  · A stoma that looks like its getting longer  · Fever of 100.4°F (38°C) or higher, or chills, or as advised by your healthcare provider   · Redness, swelling, bleeding, or drainage from your incision  · Constipation  · Diarrhea  · Nausea or vomiting  · Increased pain in the belly or around the stoma   Date Last Reviewed: 7/1/2016  © 7570-1408 [employer]. [address] [place] [address]. All rights reserved. This information is not intended as a substitute for professional medical care. Always follow your healthcare professional's instructions.

## 2017-10-18 PROBLEM — N18.30 CKD (CHRONIC KIDNEY DISEASE), STAGE III: Status: ACTIVE | Noted: 2017-10-18

## 2017-10-18 PROBLEM — K94.01 BLEEDING FROM COLOSTOMY: Status: ACTIVE | Noted: 2017-10-18

## 2017-10-23 ENCOUNTER — PATIENT OUTREACH (OUTPATIENT)
Dept: ADMINISTRATIVE | Facility: CLINIC | Age: 82
End: 2017-10-23

## 2017-10-23 NOTE — PATIENT INSTRUCTIONS
Discharge Instructions for Colostomy  You just underwent a procedure that required a colostomy. This is a life-saving procedure that involves removing or disconnecting part of your colon (large intestine). If your large intestine was diseased, your healthcare provider may have removed it. If it was injured, your healthcare provider may have disconnected it for a short time so that it can heal. After it heals, your healthcare provider may reconnect it. During a colostomy formation, your healthcare provider reroutes your colon through your abdominal wall. Stool and mucus can then pass out of your body through this opening, called a stoma. The following are general guidelines for home care following a colostomy. Your healthcare provider will go over any information that is specific to your condition.  Home care  Suggestions for home care include the following:  · Take care of your stoma as directed. Your healthcare provider and ostomy nurse discussed how to do this with you before you left the hospital.  · Ask your healthcare provider or ostomy nurse for a patient education sheet about colostomy care before you leave the hospital. This will help remind you how to care for yourself.  · If a partner or significant other will be helping you recover, ask the medical team to educate that person on ostomy care as well.   · Dont lift anything heavier than 5 pounds until your healthcare provider says it is OK.  · Dont drive until after your first healthcare providers appointment after your surgery.  · If you ride in a car for more than short trips, stop often to stretch your legs.  · Ask your healthcare provider when you can expect to return to work. Most patients are able to return to work within 4 to 6 weeks after surgery.  · Increase your activity gradually. Take short walks on a level surface.  · Wash your incision site with soap and water and pat it dry.  · Check your incision every day for redness, drainage, swelling,  or separation of the skin.  · Take your medicines exactly as directed. Dont skip doses.  · Dont take any over-the-counter medicine unless your healthcare provider tells you to do so.  Call your healthcare provider  Call your healthcare provider immediately if you have any of the following:  · Excessive bleeding from your stoma  · Blood in your stool  · Stool that is very hard  · No gas or stool  · Change in the color of your stoma  · Bulging skin around your stoma  · A stoma that looks like its getting longer  · Fever of 100.4°F (38°C) or higher, or chills, or as advised by your healthcare provider   · Redness, swelling, bleeding, or drainage from your incision  · Constipation  · Diarrhea  · Nausea or vomiting  · Increased pain in the belly or around the stoma   Date Last Reviewed: 7/1/2016  © 9641-7075 [employer]. [address] [place] [address]. All rights reserved. This information is not intended as a substitute for professional medical care. Always follow your healthcare professional's instructions.

## 2017-10-27 PROBLEM — R53.81 PHYSICAL DECONDITIONING: Status: ACTIVE | Noted: 2017-10-27

## 2017-11-01 NOTE — PHYSICIAN QUERY
PT Name: Erin Frazier  MR #: [id number]     Physician Query Form - Etiology of Condition Clarification      CDS/: Daisy Tovar               Contact information:  This form is a permanent document in the medical record.     Query Date: November 1, 2017    By submitting this query, we are merely seeking further clarification of documentation.  Please utilize your independent clinical judgment when addressing the question(s) below.     The medical record contains the following:    Findings Supporting Clinical Information Location in Medical Record   Gi Bleed  Admitted to ICU for GIB - underwent EGD 10/3 with banding of 1 gastric varix  d/c summary     Please document your best medical opinion regarding the etiology of _______ GI Bleed__________ for which the primary focus of treatment is/was directed.       ---XXXX------ gastric varcies    -----------other (specify)                  [    ]  Clinically Undetermined    Please document in your progress notes daily for the duration of treatment, until resolved, and include in your discharge summary.

## 2017-11-08 PROBLEM — R11.2 INTRACTABLE VOMITING WITH NAUSEA: Status: RESOLVED | Noted: 2017-08-24 | Resolved: 2017-11-08

## 2017-12-22 PROBLEM — N17.9 ACUTE RENAL FAILURE SUPERIMPOSED ON STAGE 3 CHRONIC KIDNEY DISEASE: Status: ACTIVE | Noted: 2017-10-18

## 2017-12-22 PROBLEM — D64.9 ANEMIA: Status: ACTIVE | Noted: 2017-12-22

## 2017-12-24 PROBLEM — K92.2 GIB (GASTROINTESTINAL BLEEDING): Status: RESOLVED | Noted: 2017-10-02 | Resolved: 2017-12-24

## 2017-12-25 PROBLEM — N17.9 ACUTE RENAL FAILURE SUPERIMPOSED ON STAGE 3 CHRONIC KIDNEY DISEASE: Status: ACTIVE | Noted: 2017-12-25

## 2017-12-25 PROBLEM — N18.30 ACUTE RENAL FAILURE SUPERIMPOSED ON STAGE 3 CHRONIC KIDNEY DISEASE: Status: ACTIVE | Noted: 2017-12-25

## 2018-01-09 PROBLEM — D64.9 ANEMIA: Status: RESOLVED | Noted: 2017-12-22 | Resolved: 2018-01-09

## 2018-01-09 PROBLEM — N18.30 ACUTE RENAL FAILURE SUPERIMPOSED ON STAGE 3 CHRONIC KIDNEY DISEASE: Status: RESOLVED | Noted: 2017-12-25 | Resolved: 2018-01-09

## 2018-01-09 PROBLEM — I50.9 ACUTE DECOMPENSATED HEART FAILURE: Status: ACTIVE | Noted: 2018-01-09

## 2018-01-09 PROBLEM — N17.9 ACUTE RENAL FAILURE SUPERIMPOSED ON STAGE 3 CHRONIC KIDNEY DISEASE: Status: RESOLVED | Noted: 2017-12-25 | Resolved: 2018-01-09

## 2018-01-10 PROBLEM — I50.9 ACUTE DECOMPENSATED HEART FAILURE: Status: RESOLVED | Noted: 2018-01-09 | Resolved: 2018-01-10

## 2018-01-22 PROBLEM — R41.82 ALTERED MENTAL STATUS: Status: ACTIVE | Noted: 2018-01-22

## 2018-01-22 PROBLEM — R06.02 SOB (SHORTNESS OF BREATH): Status: ACTIVE | Noted: 2018-01-22

## 2018-11-11 NOTE — PROGRESS NOTES
C3 nurse attempted to contact patient. The following occurred:   C3 nurse attempted to contact Erin Mercado for a TCC post hospital discharge follow up call. The patient is unable to conduct the call @ this time. The patient requested a callback.    The patient has a scheduled HOSFU appointment with Dr Mercado on10/27/17  @ 1440 hrs. Message sent to Physician staff.         
No

## 2023-04-03 PROBLEM — K85.90 ACUTE PANCREATITIS WITHOUT NECROSIS OR INFECTION, UNSPECIFIED: Status: ACTIVE | Noted: 2017-08-22
